# Patient Record
Sex: FEMALE | Race: WHITE | HISPANIC OR LATINO | Employment: UNEMPLOYED | URBAN - METROPOLITAN AREA
[De-identification: names, ages, dates, MRNs, and addresses within clinical notes are randomized per-mention and may not be internally consistent; named-entity substitution may affect disease eponyms.]

---

## 2018-01-18 ENCOUNTER — APPOINTMENT (OUTPATIENT)
Dept: RADIOLOGY | Facility: MEDICAL CENTER | Age: 23
End: 2018-01-18
Attending: EMERGENCY MEDICINE
Payer: COMMERCIAL

## 2018-01-18 ENCOUNTER — HOSPITAL ENCOUNTER (EMERGENCY)
Facility: MEDICAL CENTER | Age: 23
End: 2018-01-18
Attending: EMERGENCY MEDICINE
Payer: COMMERCIAL

## 2018-01-18 VITALS
DIASTOLIC BLOOD PRESSURE: 69 MMHG | HEIGHT: 63 IN | BODY MASS INDEX: 16.37 KG/M2 | RESPIRATION RATE: 18 BRPM | TEMPERATURE: 97.7 F | SYSTOLIC BLOOD PRESSURE: 112 MMHG | HEART RATE: 82 BPM | WEIGHT: 92.37 LBS | OXYGEN SATURATION: 100 %

## 2018-01-18 DIAGNOSIS — O21.0 HYPEREMESIS GRAVIDARUM: ICD-10-CM

## 2018-01-18 LAB
ANION GAP SERPL CALC-SCNC: 17 MMOL/L (ref 0–11.9)
APPEARANCE UR: CLEAR
B-HCG SERPL-ACNC: ABNORMAL MIU/ML (ref 0–5)
BACTERIA #/AREA URNS HPF: ABNORMAL /HPF
BACTERIA GENITAL QL WET PREP: NORMAL
BASOPHILS # BLD AUTO: 0.7 % (ref 0–1.8)
BASOPHILS # BLD: 0.06 K/UL (ref 0–0.12)
BILIRUB UR QL STRIP.AUTO: NEGATIVE
BUN SERPL-MCNC: 19 MG/DL (ref 8–22)
C TRACH DNA SPEC QL NAA+PROBE: NEGATIVE
CALCIUM SERPL-MCNC: 10.4 MG/DL (ref 8.5–10.5)
CHLORIDE SERPL-SCNC: 103 MMOL/L (ref 96–112)
CO2 SERPL-SCNC: 15 MMOL/L (ref 20–33)
COLOR UR: YELLOW
CREAT SERPL-MCNC: 0.55 MG/DL (ref 0.5–1.4)
CULTURE IF INDICATED INDCX: YES UA CULTURE
EOSINOPHIL # BLD AUTO: 0 K/UL (ref 0–0.51)
EOSINOPHIL NFR BLD: 0 % (ref 0–6.9)
EPI CELLS #/AREA URNS HPF: ABNORMAL /HPF
ERYTHROCYTE [DISTWIDTH] IN BLOOD BY AUTOMATED COUNT: 39.1 FL (ref 35.9–50)
GLUCOSE SERPL-MCNC: 72 MG/DL (ref 65–99)
GLUCOSE UR STRIP.AUTO-MCNC: NEGATIVE MG/DL
HCT VFR BLD AUTO: 48 % (ref 37–47)
HGB BLD-MCNC: 16.5 G/DL (ref 12–16)
HYALINE CASTS #/AREA URNS LPF: ABNORMAL /LPF
IMM GRANULOCYTES # BLD AUTO: 0.03 K/UL (ref 0–0.11)
IMM GRANULOCYTES NFR BLD AUTO: 0.4 % (ref 0–0.9)
KETONES UR STRIP.AUTO-MCNC: >=160 MG/DL
LEUKOCYTE ESTERASE UR QL STRIP.AUTO: ABNORMAL
LIPASE SERPL-CCNC: 27 U/L (ref 11–82)
LYMPHOCYTES # BLD AUTO: 1.08 K/UL (ref 1–4.8)
LYMPHOCYTES NFR BLD: 13 % (ref 22–41)
MCH RBC QN AUTO: 29.7 PG (ref 27–33)
MCHC RBC AUTO-ENTMCNC: 34.4 G/DL (ref 33.6–35)
MCV RBC AUTO: 86.3 FL (ref 81.4–97.8)
MICRO URNS: ABNORMAL
MONOCYTES # BLD AUTO: 0.33 K/UL (ref 0–0.85)
MONOCYTES NFR BLD AUTO: 4 % (ref 0–13.4)
N GONORRHOEA DNA SPEC QL NAA+PROBE: NEGATIVE
NEUTROPHILS # BLD AUTO: 6.8 K/UL (ref 2–7.15)
NEUTROPHILS NFR BLD: 81.9 % (ref 44–72)
NITRITE UR QL STRIP.AUTO: NEGATIVE
NRBC # BLD AUTO: 0 K/UL
NRBC BLD-RTO: 0 /100 WBC
NUMBER OF RH DOSES IND 8505RD: NORMAL
PH UR STRIP.AUTO: 5.5 [PH]
PLATELET # BLD AUTO: 329 K/UL (ref 164–446)
PMV BLD AUTO: 9.3 FL (ref 9–12.9)
POTASSIUM SERPL-SCNC: 4 MMOL/L (ref 3.6–5.5)
PROT UR QL STRIP: NEGATIVE MG/DL
RBC # BLD AUTO: 5.56 M/UL (ref 4.2–5.4)
RBC # URNS HPF: ABNORMAL /HPF
RBC UR QL AUTO: NEGATIVE
RH BLD: NORMAL
SIGNIFICANT IND 70042: NORMAL
SITE SITE: NORMAL
SODIUM SERPL-SCNC: 135 MMOL/L (ref 135–145)
SOURCE SOURCE: NORMAL
SP GR UR STRIP.AUTO: 1.03
SPECIMEN SOURCE: NORMAL
UROBILINOGEN UR STRIP.AUTO-MCNC: 0.2 MG/DL
WBC # BLD AUTO: 8.3 K/UL (ref 4.8–10.8)
WBC #/AREA URNS HPF: ABNORMAL /HPF

## 2018-01-18 PROCEDURE — 81001 URINALYSIS AUTO W/SCOPE: CPT

## 2018-01-18 PROCEDURE — 87491 CHLMYD TRACH DNA AMP PROBE: CPT

## 2018-01-18 PROCEDURE — 84702 CHORIONIC GONADOTROPIN TEST: CPT

## 2018-01-18 PROCEDURE — 87591 N.GONORRHOEAE DNA AMP PROB: CPT

## 2018-01-18 PROCEDURE — 86901 BLOOD TYPING SEROLOGIC RH(D): CPT

## 2018-01-18 PROCEDURE — 76817 TRANSVAGINAL US OBSTETRIC: CPT

## 2018-01-18 PROCEDURE — 96361 HYDRATE IV INFUSION ADD-ON: CPT

## 2018-01-18 PROCEDURE — 83690 ASSAY OF LIPASE: CPT

## 2018-01-18 PROCEDURE — 85025 COMPLETE CBC W/AUTO DIFF WBC: CPT

## 2018-01-18 PROCEDURE — 87086 URINE CULTURE/COLONY COUNT: CPT

## 2018-01-18 PROCEDURE — 700111 HCHG RX REV CODE 636 W/ 250 OVERRIDE (IP): Performed by: EMERGENCY MEDICINE

## 2018-01-18 PROCEDURE — 96374 THER/PROPH/DIAG INJ IV PUSH: CPT

## 2018-01-18 PROCEDURE — 80048 BASIC METABOLIC PNL TOTAL CA: CPT

## 2018-01-18 PROCEDURE — 99285 EMERGENCY DEPT VISIT HI MDM: CPT

## 2018-01-18 PROCEDURE — 700105 HCHG RX REV CODE 258: Performed by: EMERGENCY MEDICINE

## 2018-01-18 RX ORDER — ONDANSETRON 2 MG/ML
4 INJECTION INTRAMUSCULAR; INTRAVENOUS ONCE
Status: COMPLETED | OUTPATIENT
Start: 2018-01-18 | End: 2018-01-18

## 2018-01-18 RX ORDER — SODIUM CHLORIDE 9 MG/ML
1000 INJECTION, SOLUTION INTRAVENOUS ONCE
Status: COMPLETED | OUTPATIENT
Start: 2018-01-18 | End: 2018-01-18

## 2018-01-18 RX ORDER — ONDANSETRON 4 MG/1
4 TABLET, ORALLY DISINTEGRATING ORAL EVERY 8 HOURS PRN
Qty: 10 TAB | Refills: 0 | Status: SHIPPED | OUTPATIENT
Start: 2018-01-18 | End: 2020-11-06

## 2018-01-18 RX ADMIN — ONDANSETRON 4 MG: 2 INJECTION INTRAMUSCULAR; INTRAVENOUS at 12:45

## 2018-01-18 RX ADMIN — SODIUM CHLORIDE 1000 ML: 9 INJECTION, SOLUTION INTRAVENOUS at 12:45

## 2018-01-18 ASSESSMENT — PAIN SCALES - GENERAL: PAINLEVEL_OUTOF10: 0

## 2018-01-18 NOTE — ED NOTES
Pt to triage c/o N/V x 1 week. Pt unable to tolerate fluids.  Pt 6 weeks pregnant. Denies vaginal bleeding. Pt advised to return to triage nurse for any changes or concerns.

## 2018-01-18 NOTE — DISCHARGE INSTRUCTIONS
Morning Sickness  Morning sickness is when you feel sick to your stomach (nauseous) during pregnancy. This nauseous feeling may or may not come with vomiting. It often occurs in the morning but can be a problem any time of day. Morning sickness is most common during the first trimester, but it may continue throughout pregnancy. While morning sickness is unpleasant, it is usually harmless unless you develop severe and continual vomiting (hyperemesis gravidarum). This condition requires more intense treatment.   CAUSES   The cause of morning sickness is not completely known but seems to be related to normal hormonal changes that occur in pregnancy.  RISK FACTORS  You are at greater risk if you:  · Experienced nausea or vomiting before your pregnancy.  · Had morning sickness during a previous pregnancy.  · Are pregnant with more than one baby, such as twins.  TREATMENT   Do not use any medicines (prescription, over-the-counter, or herbal) for morning sickness without first talking to your health care provider. Your health care provider may prescribe or recommend:  · Vitamin B6 supplements.  · Anti-nausea medicines.  · The herbal medicine tung.  HOME CARE INSTRUCTIONS   · Only take over-the-counter or prescription medicines as directed by your health care provider.  · Taking multivitamins before getting pregnant can prevent or decrease the severity of morning sickness in most women.  · Eat a piece of dry toast or unsalted crackers before getting out of bed in the morning.  · Eat five or six small meals a day.  · Eat dry and bland foods (rice, baked potato). Foods high in carbohydrates are often helpful.  · Do not drink liquids with your meals. Drink liquids between meals.  · Avoid greasy, fatty, and spicy foods.  · Get someone to cook for you if the smell of any food causes nausea and vomiting.  · If you feel nauseous after taking prenatal vitamins, take the vitamins at night or with a snack.   · Snack on protein  foods (nuts, yogurt, cheese) between meals if you are hungry.  · Eat unsweetened gelatins for desserts.  · Wearing an acupressure wristband (worn for sea sickness) may be helpful.  · Acupuncture may be helpful.  · Do not smoke.  · Get a humidifier to keep the air in your house free of odors.  · Get plenty of fresh air.  SEEK MEDICAL CARE IF:   · Your home remedies are not working, and you need medicine.  · You feel dizzy or lightheaded.  · You are losing weight.  SEEK IMMEDIATE MEDICAL CARE IF:   · You have persistent and uncontrolled nausea and vomiting.  · You pass out (faint).  MAKE SURE YOU:  · Understand these instructions.  · Will watch your condition.  · Will get help right away if you are not doing well or get worse.     This information is not intended to replace advice given to you by your health care provider. Make sure you discuss any questions you have with your health care provider.     Document Released: 02/08/2008 Document Revised: 12/23/2014 Document Reviewed: 06/04/2014  Elsevier Interactive Patient Education ©2016 [employer]

## 2018-01-18 NOTE — ED PROVIDER NOTES
"ED Provider Note    Scribed for Tye Degroot D.O. by Lorelei Ogden. 2018  12:19 PM    Primary care provider: Pcp Pt States None  Means of arrival: Private vehicle  History obtained from: Patient  History limited by: None    CHIEF COMPLAINT  Chief Complaint   Patient presents with   • Morning Sickness     HPI  Chelsey Florentino is a 23 y.o. female who presents to the Emergency Department for evaluation of morning sickness. Patient reports she is currently six-weeks pregnant, A0. LMP was six weeks ago. She states her morning sickness began one week ago and has been severe. She states she has not been able to eat or drink anything without vomiting and this is abnormal from previous pregnancy. Patient states associated diffuse lower abdominal pain and mild vaginal discharge. Denies vaginal bleeding or dysuria. Denies history of abdominal surgeries. Denies medical history.     REVIEW OF SYSTEMS  Pertinent positives include morning sickness, slight diffuse abdominal pain . Pertinent negatives include no fever, hematemesis, hematochezia, melena, dysuria, hematuria, chest pain    PAST MEDICAL HISTORY  No past medical history on file.    SURGICAL HISTORY  No past surgical history on file.     SOCIAL HISTORY  Social History   Substance Use Topics   • Smoking status: Former Smoker     Quit date: 2015   • Smokeless tobacco: Never Used      Comment: last used 3 years ago   • Alcohol use No      Comment: occ      History   Drug Use No       FAMILY HISTORY  Family History   Problem Relation Age of Onset   • Arthritis Maternal Grandmother    • Arthritis Maternal Grandfather        CURRENT MEDICATIONS  Home Medications    **Home medications have not yet been reviewed for this encounter**         ALLERGIES  No Known Allergies    PHYSICAL EXAM  VITAL SIGNS: /66   Pulse (!) 106   Temp 36.2 °C (97.2 °F)   Resp 18   Ht 1.6 m (5' 3\")   Wt 41.9 kg (92 lb 6 oz)   SpO2 100%   BMI 16.36 kg/m² "   Constitutional: Well developed, Well nourished, mild distress, with full emesis bag, Non-toxic appearance.   HENT: Normocephalic, Atraumatic, tympanic membranes normal, moist mucous membranes, No oral exudates, no rhinorrhea.  Eyes: PERRLA, EOMI, Conjunctiva normal, No discharge.   Cardiovascular: Slightly tachycardic Normal rhythm, No murmurs, No rubs, No gallops.   Thorax & Lungs:  No respiratory distress, No rales, No rhonchi, No wheezing, No chest tenderness.   Abdomen: Bowel sounds normal, Soft, slight suprapubic tenderness, No guarding, No rebound, No masses, No pulsatile masses.   Skin: Warm, Dry, No erythema, No rash.   Back: No thoracic or lumbar spinetenderness, No CVA tenderness.   Extremities: No edema, No evidence of deformity, Full range of motion,  Neurologic: Alert & oriented x 3,  No focal deficits noted, normal gait.  holign emeis bag   Slight suprapubic abdominal tendnerness, negatie murphys sign, no [place] tnederness,     DIAGNOSTIC STUDIES/PROCEDURES    LABS  Results for orders placed or performed during the hospital encounter of 01/18/18   CBC WITH DIFFERENTIAL   Result Value Ref Range    WBC 8.3 4.8 - 10.8 K/uL    RBC 5.56 (H) 4.20 - 5.40 M/uL    Hemoglobin 16.5 (H) 12.0 - 16.0 g/dL    Hematocrit 48.0 (H) 37.0 - 47.0 %    MCV 86.3 81.4 - 97.8 fL    MCH 29.7 27.0 - 33.0 pg    MCHC 34.4 33.6 - 35.0 g/dL    RDW 39.1 35.9 - 50.0 fL    Platelet Count 329 164 - 446 K/uL    MPV 9.3 9.0 - 12.9 fL    Neutrophils-Polys 81.90 (H) 44.00 - 72.00 %    Lymphocytes 13.00 (L) 22.00 - 41.00 %    Monocytes 4.00 0.00 - 13.40 %    Eosinophils 0.00 0.00 - 6.90 %    Basophils 0.70 0.00 - 1.80 %    Immature Granulocytes 0.40 0.00 - 0.90 %    Nucleated RBC 0.00 /100 WBC    Neutrophils (Absolute) 6.80 2.00 - 7.15 K/uL    Lymphs (Absolute) 1.08 1.00 - 4.80 K/uL    Monos (Absolute) 0.33 0.00 - 0.85 K/uL    Eos (Absolute) 0.00 0.00 - 0.51 K/uL    Baso (Absolute) 0.06 0.00 - 0.12 K/uL    Immature Granulocytes  (abs) 0.03 0.00 - 0.11 K/uL    NRBC (Absolute) 0.00 K/uL   BASIC METABOLIC PANEL   Result Value Ref Range    Sodium 135 135 - 145 mmol/L    Potassium 4.0 3.6 - 5.5 mmol/L    Chloride 103 96 - 112 mmol/L    Co2 15 (L) 20 - 33 mmol/L    Glucose 72 65 - 99 mg/dL    Bun 19 8 - 22 mg/dL    Creatinine 0.55 0.50 - 1.40 mg/dL    Calcium 10.4 8.5 - 10.5 mg/dL    Anion Gap 17.0 (H) 0.0 - 11.9   LIPASE   Result Value Ref Range    Lipase 27 11 - 82 U/L   RH TYPE FOR RHOGAM FROM E.D.   Result Value Ref Range    Emergency Department Rh Typing POS     Number Of Rh Doses Indicated ZERO    WET PREP   Result Value Ref Range    Significant Indicator NEG     Source GEN     Site VAGINAL     Wet Prep For Parasites       Moderate WBC's seen.  Few clue cells seen.  No yeast.  No motile Trichomonas seen.     CHLAMYDIA & GC BY PCR   Result Value Ref Range    Source Vaginal    URINALYSIS CULTURE, IF INDICATED   Result Value Ref Range    Color Yellow     Character Clear     Specific Gravity 1.030 <1.035    Ph 5.5 5.0 - 8.0    Glucose Negative Negative mg/dL    Ketones >=160 Negative mg/dL    Protein Negative Negative mg/dL    Bilirubin Negative Negative    Urobilinogen, Urine 0.2 Negative    Nitrite Negative Negative    Leukocyte Esterase Trace (A) Negative    Occult Blood Negative Negative    Micro Urine Req Microscopic     Culture Indicated Yes UA Culture   ESTIMATED GFR   Result Value Ref Range    GFR If African American >60 >60 mL/min/1.73 m 2    GFR If Non African American >60 >60 mL/min/1.73 m 2   URINE MICROSCOPIC (W/UA)   Result Value Ref Range    WBC 2-5 /hpf    RBC 0-2 /hpf    Bacteria Moderate (A) None /hpf    Epithelial Cells Few /hpf    Hyaline Cast 0-2 /lpf         RADIOLOGY  US-OB PELVIS TRANSVAGINAL   Final Result      6 week 4 day IUP. OUMAR 9/9/2018. Heart rate 163 BPM.   Small subchorionic hemorrhage.   Small free fluid collection posterior cul-de-sac.   Multiple right ovarian cysts.        The radiologist's interpretation of  all radiological studies have been reviewed by me.    COURSE & MEDICAL DECISION MAKING  Nursing notes, VS, PMSFHx reviewed in chart.    12:19 PM - Patient seen and examined at bedside. Patient will be treated with Zofran.    This is a charming 23 y.o. female that presents with hyperemesis gravidarum. The patient had an ultrasound that revealed evidence of a 6 week intrauterine pregnancy and a right ovarian cyst. The patient has no evidence of significant infection, notes of ectopic pregnancy, she is a nontender abdomen that would be suspicious for appendicitis, cholecystitis or other significant abdominal conditions requiring surgical intervention. The patient received IV fluids, Zofran 4 mg IV. I reevaluation, she is resting currently, she is a nontender abdomen, nonsurgical abdomen. The patient will be following up with her gynecologist/OB and received a prescription as below. Strict return precautions have been given for vaginal bleeding, severe pain, fever or increasing abdominal discomfort.          1 L normal saline IV fluid was given secondary dehydration, vomiting tachycardia. Reevaluation, the patient has a normal blood pressure, she states she feels much better and rehydrated.  DISPOSITION:  Patient will be discharged home in stable condition.    FOLLOW UP:  [facility], Emergency Dept  [address] [facility] [id number] [phone]    If symptoms worsen    Gena Brooks M.D.  [address] [place] [id number] [phone]    Schedule an appointment as soon as possible for a visit  As needed      OUTPATIENT MEDICATIONS:  New Prescriptions    ONDANSETRON (ZOFRAN ODT) 4 MG TABLET DISPERSIBLE    Take 1 Tab by mouth every 8 hours as needed.       FINAL IMPRESSION  1. Hyperemesis gravidarum          Lorelei LEYVA (Dioni), am scribing for, and in the presence of, Tye Degroot D.O.    Electronically signed by: Lorelei Ogden (Dioni), 1/18/2018    AUTUMN  Tye Degroot D.O. personally performed the services described in this documentation, as scribed by Lorelei Ogden in my presence, and it is both accurate and complete.    The note accurately reflects work and decisions made by me.  Tye Degroot  1/18/2018  3:05 PM

## 2018-01-20 LAB
BACTERIA UR CULT: ABNORMAL
BACTERIA UR CULT: ABNORMAL
SIGNIFICANT IND 70042: ABNORMAL
SITE SITE: ABNORMAL
SOURCE SOURCE: ABNORMAL

## 2018-01-22 NOTE — PROGRESS NOTES
"ED Positive Culture Follow-up/Notification Note:    Date: 1/22/18     Patient seen in the ED on 1/18/2018 for:    1. Hyperemesis gravidarum       Discharge Medication List as of 1/18/2018  3:14 PM      START taking these medications    Details   ondansetron (ZOFRAN ODT) 4 MG TABLET DISPERSIBLE Take 1 Tab by mouth every 8 hours as needed., Disp-10 Tab, R-0, Print Rx Paper             Allergies: Patient has no known allergies.     Vitals:    01/18/18 1143 01/18/18 1205 01/18/18 1513   BP: 114/66  112/69   Pulse: (!) 106  82   Resp: 18  18   Temp: 36.2 °C (97.2 °F)  36.5 °C (97.7 °F)   SpO2: 100%     Weight:  41.9 kg (92 lb 6 oz)    Height: 1.6 m (5' 3\")         Final cultures:   Results     Procedure Component Value Units Date/Time    URINE CULTURE(NEW) [[id number]]  (Abnormal) Collected:  01/18/18 1240    Order Status:  Completed Specimen:  Urine Updated:  01/20/18 1750     Significant Indicator POS (POS)     Source UR     Site --     Urine Culture Mixed skin lissy <10,000 cfu/mL (A)     Urine Culture -- (A)     Probable Gardnerella vaginalis  >100,000 cfu/mL      CHLAMYDIA & GC BY PCR [[id number]] Collected:  01/18/18 1445    Order Status:  Completed Specimen:  Genital from Genital Updated:  01/18/18 2138     Source Vaginal     C. trachomatis by PCR Negative     N. gonorrhoeae by PCR Negative    URINE CULTURE (ADD ON) [[id number]]     Order Status:  Canceled Specimen:  Urine     WET PREP [[id number]] Collected:  01/18/18 1445    Order Status:  Completed Specimen:  Genital from Vaginal Updated:  01/18/18 1455     Significant Indicator NEG     Source GEN     Site VAGINAL     Wet Prep For Parasites --     Moderate WBC's seen.  Few clue cells seen.  No yeast.  No motile Trichomonas seen.      URINALYSIS CULTURE, IF INDICATED [[id number]]  (Abnormal) Collected:  01/18/18 1240    Order Status:  Completed Specimen:  Genital Updated:  01/18/18 1324     Color Yellow     Character Clear     Specific Gravity 1.030     Ph 5.5     " Glucose Negative mg/dL      Ketones >=160 mg/dL      Protein Negative mg/dL      Bilirubin Negative     Urobilinogen, Urine 0.2     Nitrite Negative     Leukocyte Esterase Trace (A)     Occult Blood Negative     Micro Urine Req Microscopic     Culture Indicated Yes UA Culture           Plan:   - Gardnerella vaginalis is a component of the normal skin lissy that was isolated on culture.  Since no symptoms of bacterial vaginosis were documented, no treatment is indicated at this time.     Dory Fernandez

## 2020-10-28 ENCOUNTER — TELEPHONE (OUTPATIENT)
Dept: SCHEDULING | Facility: IMAGING CENTER | Age: 25
End: 2020-10-28

## 2020-11-06 ENCOUNTER — OFFICE VISIT (OUTPATIENT)
Dept: MEDICAL GROUP | Age: 25
End: 2020-11-06
Payer: COMMERCIAL

## 2020-11-06 VITALS
HEART RATE: 80 BPM | TEMPERATURE: 97.9 F | SYSTOLIC BLOOD PRESSURE: 112 MMHG | BODY MASS INDEX: 22.08 KG/M2 | DIASTOLIC BLOOD PRESSURE: 70 MMHG | OXYGEN SATURATION: 97 % | WEIGHT: 120 LBS | HEIGHT: 62 IN

## 2020-11-06 DIAGNOSIS — Z76.89 ESTABLISHING CARE WITH NEW DOCTOR, ENCOUNTER FOR: ICD-10-CM

## 2020-11-06 DIAGNOSIS — M79.5 FOREIGN BODY (FB) IN SOFT TISSUE: ICD-10-CM

## 2020-11-06 DIAGNOSIS — Z23 NEED FOR VACCINATION: ICD-10-CM

## 2020-11-06 DIAGNOSIS — Z78.9 BODY PIERCING: ICD-10-CM

## 2020-11-06 PROCEDURE — 90471 IMMUNIZATION ADMIN: CPT | Performed by: PHYSICIAN ASSISTANT

## 2020-11-06 PROCEDURE — 90686 IIV4 VACC NO PRSV 0.5 ML IM: CPT | Performed by: PHYSICIAN ASSISTANT

## 2020-11-06 PROCEDURE — 99203 OFFICE O/P NEW LOW 30 MIN: CPT | Mod: 25 | Performed by: PHYSICIAN ASSISTANT

## 2020-11-06 ASSESSMENT — PATIENT HEALTH QUESTIONNAIRE - PHQ9
CLINICAL INTERPRETATION OF PHQ2 SCORE: 1
SUM OF ALL RESPONSES TO PHQ QUESTIONS 1-9: 5
5. POOR APPETITE OR OVEREATING: 0 - NOT AT ALL

## 2020-11-06 NOTE — PROGRESS NOTES
"cc: Establish care and removal of body piercing    Subjective:     HPI  Chelsey Florentino is a 25 y.o. female presenting to [facility] care.  She has never been seen by primary care.  She is currently followed by gynecology.  She is scheduled for Pap in 2 months.  She has no significant past medical history.  She exercises regularly.  She is a stay-at-home mom.  Denies dizziness, chest pain, palpitations, shortness of breath.    Body piercing  She has two piercings in her lower back.  One of the piercings on the left side has become embedded.  She did go back to her body [facility] to see if they can remove it, they state that it needs to be excised and they cannot do it.  She is requesting removal.  Denies surrounding erythema, edema, warmth, drainage.    Review of systems:  See above.       Current Outpatient Medications:   •  PRENATAL VIT W/ FE BISG-FA PO, Take  by mouth., Disp: , Rfl:     Allergies, past medical history, past surgical history, family history, social history reviewed and updated    Objective:     Vitals: /70 (BP Location: Left arm, Patient Position: Sitting, BP Cuff Size: Adult)   Pulse 80   Temp 36.6 °C (97.9 °F) (Temporal)   Ht 1.575 m (5' 2\")   Wt 54.4 kg (120 lb)   SpO2 97%   BMI 21.95 kg/m²   General: Alert, pleasant, NAD  HEENT: Normocephalic. Neck supple.  No thyromegaly or masses palpated. No cervical or supraclavicular lymphadenopathy. No carotid bruits   Heart: Regular rate and rhythm.  S1 and S2 normal.  No murmurs appreciated.  Respiratory: Normal respiratory effort.  Clear to auscultation bilaterally.  Skin: Warm, dry, no rashes.  Back: 2 piercings of the lower back, left piercing is embedded.  No surrounding erythema, edema, warmth, drainage  Extremities: No leg edema.  Radial pulses 2+ symmetric  Psych:  Affect/mood is normal, judgement is good, memory is intact, grooming is appropriate.    Assessment/Plan:     Chelsey was seen today for establish care and " other.    Diagnoses and all orders for this visit:    Foreign body (FB) in soft tissue  -She is requesting removal of her body piercings.  Evaluated with Dr. Velasco and he will remove the piercings.    Body piercing  -See above    Need for vaccination  -Immunization given in clinic today  -     Influenza Vaccine Quad Injection (PF)    Establishing care with new doctor, encounter for  -We will request old records and review when received.  She is to follow-up with gynecology for Pap.    Return for With Dr. Velasco for excision of piercings.   3 months for annual physical

## 2020-11-22 ENCOUNTER — HOSPITAL ENCOUNTER (OUTPATIENT)
Facility: MEDICAL CENTER | Age: 25
End: 2020-11-22
Attending: NURSE PRACTITIONER
Payer: COMMERCIAL

## 2020-11-22 ENCOUNTER — OFFICE VISIT (OUTPATIENT)
Dept: URGENT CARE | Facility: CLINIC | Age: 25
End: 2020-11-22
Payer: COMMERCIAL

## 2020-11-22 VITALS
DIASTOLIC BLOOD PRESSURE: 74 MMHG | HEIGHT: 62 IN | OXYGEN SATURATION: 99 % | RESPIRATION RATE: 18 BRPM | TEMPERATURE: 98.1 F | WEIGHT: 120 LBS | SYSTOLIC BLOOD PRESSURE: 112 MMHG | HEART RATE: 86 BPM | BODY MASS INDEX: 22.08 KG/M2

## 2020-11-22 DIAGNOSIS — Z20.822 EXPOSURE TO COVID-19 VIRUS: ICD-10-CM

## 2020-11-22 DIAGNOSIS — Z20.822 SUSPECTED COVID-19 VIRUS INFECTION: ICD-10-CM

## 2020-11-22 PROCEDURE — 99214 OFFICE O/P EST MOD 30 MIN: CPT | Mod: CS | Performed by: NURSE PRACTITIONER

## 2020-11-22 PROCEDURE — U0003 INFECTIOUS AGENT DETECTION BY NUCLEIC ACID (DNA OR RNA); SEVERE ACUTE RESPIRATORY SYNDROME CORONAVIRUS 2 (SARS-COV-2) (CORONAVIRUS DISEASE [COVID-19]), AMPLIFIED PROBE TECHNIQUE, MAKING USE OF HIGH THROUGHPUT TECHNOLOGIES AS DESCRIBED BY CMS-2020-01-R: HCPCS

## 2020-11-22 ASSESSMENT — ENCOUNTER SYMPTOMS
FEVER: 0
CHILLS: 0
WHEEZING: 0
SHORTNESS OF BREATH: 0
COUGH: 0
HEADACHES: 1
SORE THROAT: 1
NAUSEA: 0
VOMITING: 0
DIARRHEA: 1

## 2020-11-22 NOTE — PROGRESS NOTES
"Subjective:   Chelsey Florentino is a 25 y.o. female who presents for Nasal Congestion (Chest pain, headache, fatigue diarrhea x 3 days )      HPI  25 year old female in urgent care for new onset symptoms 3 days ago   COVID exposure: Close exposure to COVID  OTC medications: is not taking anything OTC for symptoms    Review of Systems   Constitutional: Positive for malaise/fatigue. Negative for chills and fever.   HENT: Positive for congestion and sore throat.    Respiratory: Negative for cough, shortness of breath and wheezing.    Gastrointestinal: Positive for diarrhea. Negative for nausea and vomiting.   Neurological: Positive for headaches.       Patient Active Problem List   Diagnosis   (none) - all problems resolved or deleted     History reviewed. No pertinent surgical history.  Social History     Tobacco Use   • Smoking status: Never Smoker   • Smokeless tobacco: Never Used   Substance Use Topics   • Alcohol use: Yes     Comment: occ   • Drug use: No      Family History   Problem Relation Age of Onset   • No Known Problems Mother    • No Known Problems Father    • No Known Problems Sister    • Arthritis Maternal Grandmother    • Arthritis Maternal Grandfather    • No Known Problems Son    • No Known Problems Daughter       (Allergies, Medications, & Tobacco/Substance Use were reconciled by the Medical Assistant and reviewed by myself. The family history is prepopulated)     Objective:     /74   Pulse 86   Temp 36.7 °C (98.1 °F) (Temporal)   Resp 18   Ht 1.575 m (5' 2\")   Wt 54.4 kg (120 lb)   SpO2 99%   BMI 21.95 kg/m²     Physical Exam  Vitals signs reviewed.   HENT:      Right Ear: Tympanic membrane, ear canal and external ear normal.      Left Ear: Tympanic membrane, ear canal and external ear normal.      Nose: Nose normal.      Mouth/Throat:      Lips: Pink.      Mouth: Mucous membranes are moist.      Pharynx: Uvula midline.   Cardiovascular:      Rate and Rhythm: Normal rate and regular " rhythm.      Heart sounds: Normal heart sounds.   Pulmonary:      Effort: Pulmonary effort is normal.      Breath sounds: Normal breath sounds.   Neurological:      Mental Status: She is alert and oriented to person, place, and time.   Psychiatric:         Mood and Affect: Mood normal.         Behavior: Behavior normal.         Thought Content: Thought content normal.         Judgment: Judgment normal.         Assessment/Plan:     1. Suspected COVID-19 virus infection  COVID/SARS COV-2 PCR   2. Exposure to COVID-19 virus       Discussed Physical examination findings with patient are likely viral in etiology and could be due to COVID so will perform testing. Advised patient to remain in self isolation until cleared by a negative test or the health department, if they test positive. If exposed to COVID, advised to stay home for 14 days post exposure due to the possibility of developing symptoms day 2-14 post exposure. Will release results to [place]. Strict ER precautions provided for worsening symptoms including SOB, difficulty breathing or high fever unable to be controlled by OTC tylenol or NSAIDS. Viral illness are largely self limiting and patient should increase fluids using electrolyte enriched beverages as well as OTC medications such as tylenol and ibuprofen per 's instructions.      Appropriate PPE worn at all times by provider. Patient had face mask on for entirety of visit other than during oropharyngeal examination    Differential diagnosis, natural history, supportive care, and indications for immediate follow-up discussed.    Advised the patient to follow-up with the primary care physician for recheck, reevaluation, and consideration of further management.    Please note that this dictation was created using voice recognition software. I have made a reasonable attempt to correct obvious errors, but I expect that there are errors of grammar and possibly content that I did not discover before  finalizing the note.    This note was electronically signed EMILEE White

## 2020-11-23 LAB
COVID ORDER STATUS COVID19: NORMAL
SARS-COV-2 RNA RESP QL NAA+PROBE: NOTDETECTED
SPECIMEN SOURCE: NORMAL

## 2020-12-02 ENCOUNTER — HOSPITAL ENCOUNTER (OUTPATIENT)
Facility: MEDICAL CENTER | Age: 25
End: 2020-12-02
Attending: FAMILY MEDICINE
Payer: COMMERCIAL

## 2020-12-02 ENCOUNTER — OFFICE VISIT (OUTPATIENT)
Dept: MEDICAL GROUP | Age: 25
End: 2020-12-02
Payer: COMMERCIAL

## 2020-12-02 VITALS
SYSTOLIC BLOOD PRESSURE: 100 MMHG | DIASTOLIC BLOOD PRESSURE: 72 MMHG | WEIGHT: 120.6 LBS | HEIGHT: 62 IN | HEART RATE: 86 BPM | BODY MASS INDEX: 22.19 KG/M2 | OXYGEN SATURATION: 99 % | TEMPERATURE: 98.9 F

## 2020-12-02 DIAGNOSIS — D48.9 NEOPLASM OF UNCERTAIN BEHAVIOR: ICD-10-CM

## 2020-12-02 LAB — PATHOLOGY CONSULT NOTE: NORMAL

## 2020-12-02 PROCEDURE — 88304 TISSUE EXAM BY PATHOLOGIST: CPT

## 2020-12-02 PROCEDURE — 10120 INC&RMVL FB SUBQ TISS SMPL: CPT | Mod: 59 | Performed by: FAMILY MEDICINE

## 2020-12-02 NOTE — PROGRESS NOTES
This medical record contains text that has been entered with the assistance of computer voice recognition and dictation software.  Therefore, it may contain unintended errors in text, spelling, punctuation, or grammar      Chief Complaint   Patient presents with   • Tattoo Removal     dermal piercing         Chelsey Florentino is a 25 y.o. female here evaluation and management of: Dermal piercing removal      HPI:     1. Neoplasm of uncertain behavior  Patient has had 2 dermal piercings in the lower back region for over 4 years now.  She states that 1 top has slipped off and it makes it difficult for her to wear high pants or even lay on her back at times because is painful she would like to have these dermal piercings removed.    Current medicines (including changes today)  Current Outpatient Medications   Medication Sig Dispense Refill   • PRENATAL VIT W/ FE BISG-FA PO Take  by mouth.       No current facility-administered medications for this visit.      She  has a past medical history of Ovarian cyst. She also has no past medical history of Addisons disease (HCC), Adrenal disorder (HCC), Allergy, Anemia, Anxiety, Blood transfusion without reported diagnosis, Clotting disorder (HCC), Cushings syndrome (HCC), Diabetic neuropathy (HCC), GERD (gastroesophageal reflux disease), Hyperlipidemia, IBD (inflammatory bowel disease), Meningitis, Migraine, Muscle disorder, Osteoporosis, Parathyroid disorder (HCC), Pituitary disease (HCC), Sickle cell disease (HCC), or Substance abuse ([facility]).  She  has no past surgical history on file.  Social History     Tobacco Use   • Smoking status: Never Smoker   • Smokeless tobacco: Never Used   Substance Use Topics   • Alcohol use: Yes     Comment: occ   • Drug use: No     Social History     Social History Narrative   • Not on file     Family History   Problem Relation Age of Onset   • No Known Problems Mother    • No Known Problems Father    • No Known Problems Sister    • Arthritis  "Maternal Grandmother    • Arthritis Maternal Grandfather    • No Known Problems Son    • No Known Problems Daughter      Family Status   Relation Name Status   • Mo  Alive   • Fa  Alive   • Sis 2 Alive   • MGMo  (Not Specified)   • MGFa  (Not Specified)   • Son  Alive   • Marisabel  Alive         ROS    The pertinent  ROS findings can be seen in the HPI above.     All other systems reviewed and are negative     Objective:     /72 (BP Location: Left arm, Patient Position: Sitting)   Pulse 86   Temp 37.2 °C (98.9 °F) (Temporal)   Ht 1.575 m (5' 2\")   Wt 54.7 kg (120 lb 9.6 oz)   SpO2 99%  Body mass index is 22.06 kg/m².      Physical Exam:    Constitutional: Alert, no distress.  Skin:       Excision--- 6mm, raised tender mass at lower right back        Similar mass on lower left back exactly during the other mass    After informed written consent was obtained, using Betadine for cleansing and 1% Lidocaine w- epinephrine for anesthetic, with sterile technique a 8 mm punch tool was used to obtain and excise  the lesion through dermis (full thickness) . Intent was to remove entire lesion with margins . Hemostasis was obtained by pressure and wound was   Sutured  With 3.0 Ethilon x2  Antibiotic dressing is applied, and wound care instructions provided. Be alert for any signs of cutaneous infection. The specimen is labeled and sent to pathology for evaluation. The procedure was well tolerated without complications.      Final repair length (measurement of repaired defect): 1.2cm    Same procedure repeated on 2nd mass on left lower back    Eye: Equal, round and reactive, conjunctiva clear, lids normal.  ENMT: Lips without lesions, good dentition, oropharynx clear.        Assessment and Plan:   The following treatment plan was discussed      1. Neoplasm of uncertain behavior    Patient tolerated procedure well  There were no adverse events  Patient was given post procedure precautions     - Pathology Specimen; Future  - " Pathology Specimen; Future            Instructed to Follow up in clinic or ER for worsening symptoms, difficulty breathing, lack of expected recovery, or should new symptoms or problems arise.    Followup: No follow-ups on file.       Once again this medical record contains text that has been entered with the assistance of computer voice recognition and dictation software.  Therefore, it may contain unintended errors in text, spelling, punctuation, or grammar

## 2020-12-11 ENCOUNTER — NON-PROVIDER VISIT (OUTPATIENT)
Dept: MEDICAL GROUP | Age: 25
End: 2020-12-11
Payer: COMMERCIAL

## 2020-12-11 PROCEDURE — 99999 PR NO CHARGE: CPT | Performed by: FAMILY MEDICINE

## 2020-12-11 NOTE — PROGRESS NOTES
Chelsey Florentino is a 25 y.o. female here for a Non-Provider Visit for Suture Removal.    Sutures were placed by Dr Velasco on date: 12/2/2020  Skin is healed: Yes  Provider notified if skin is not healed, or if there is redness, heat, pain, or drainage from incision: yes  Sutures removed. 2 sutures from each site, 4 total  Mastisol and steristips are placed: No    Advised to use emollient (vaseline, aquaphor, etc.) as needed, avoid peroxide and antibiotic ointment to reduce irritation.     Path report has been reviewed by provider.  Path report has reviewed with patient.

## 2021-02-16 ENCOUNTER — OFFICE VISIT (OUTPATIENT)
Dept: MEDICAL GROUP | Age: 26
End: 2021-02-16
Payer: COMMERCIAL

## 2021-02-16 VITALS
HEART RATE: 89 BPM | SYSTOLIC BLOOD PRESSURE: 102 MMHG | OXYGEN SATURATION: 98 % | BODY MASS INDEX: 22.74 KG/M2 | HEIGHT: 62 IN | WEIGHT: 123.6 LBS | DIASTOLIC BLOOD PRESSURE: 62 MMHG | TEMPERATURE: 98.2 F

## 2021-02-16 DIAGNOSIS — R45.4 IRRITABILITY AND ANGER: ICD-10-CM

## 2021-02-16 DIAGNOSIS — Z78.9 VEGETARIAN: ICD-10-CM

## 2021-02-16 DIAGNOSIS — Z00.00 WELL ADULT EXAM: ICD-10-CM

## 2021-02-16 PROBLEM — D48.9 NEOPLASM OF UNCERTAIN BEHAVIOR: Status: RESOLVED | Noted: 2020-12-02 | Resolved: 2021-02-16

## 2021-02-16 PROCEDURE — 99395 PREV VISIT EST AGE 18-39: CPT | Mod: 33 | Performed by: PHYSICIAN ASSISTANT

## 2021-02-16 RX ORDER — SODIUM PHOSPHATE,MONO-DIBASIC 19G-7G/118
500 ENEMA (ML) RECTAL
COMMUNITY
End: 2023-11-30

## 2021-02-16 RX ORDER — MULTIVIT WITH MINERALS/LUTEIN
TABLET ORAL
COMMUNITY
End: 2023-11-30

## 2021-02-16 RX ORDER — CHLORAL HYDRATE 500 MG
1000 CAPSULE ORAL
COMMUNITY
End: 2023-11-30

## 2021-02-16 ASSESSMENT — PATIENT HEALTH QUESTIONNAIRE - PHQ9
SUM OF ALL RESPONSES TO PHQ QUESTIONS 1-9: 8
CLINICAL INTERPRETATION OF PHQ2 SCORE: 1
5. POOR APPETITE OR OVEREATING: 1 - SEVERAL DAYS

## 2021-02-16 NOTE — PROGRESS NOTES
Subjective:     CC:   Chief Complaint   Patient presents with   • Annual Exam       HPI:     Chelsey Florentino is a 26 y.o. female who presents for annual exam  The past few months she has felt herself becoming more angry and irritable.  She is going through a stressful time right now, they are trying to move down to [facility] and buying a new house, she is also completing school, and staying at home with her 2 children during the day.  She is quick to become angry, and this is not like herself.  She has been trying to find a counselor through her insurance, however this has not been successful.  She does not feel she needs medications at this time, but would like to get into counseling.    Patient has GYN provider: Yes  Last Pap Smear: 2021   H/O Abnormal Pap: No      Exercise: strenuous regular exercise, aerobic > 3 hours a week  Diet: FAIR-VEGATARIAN    No LMP recorded (lmp unknown).  Hx STDs: No  Birth control: IUD  No Menses Reports mild cramping and does take OTC analgesics for cramps.  No significant bloating/fluid retention, pelvic pain, or dyspareunia. No abnormal vaginal discharge.  No breast tenderness, mass, nipple discharge, changes in size or contour, or abnormal cyclic discomfort.    OB History    Para Term  AB Living   1 1 1 0 0 1   SAB TAB Ectopic Molar Multiple Live Births   0 0 0 0 0 1      She  reports being sexually active and has had partner(s) who are Male.    She  has a past medical history of Neoplasm of uncertain behavior (2020) and Ovarian cyst. She also has no past medical history of Addisons disease ([facility]), Adrenal disorder ([facility]), Allergy, Anemia, Anxiety, Blood transfusion without reported diagnosis, Clotting disorder ([facility]), Cushings syndrome ([facility]), Diabetic neuropathy ([facility]), GERD (gastroesophageal reflux disease), Hyperlipidemia, IBD (inflammatory bowel disease), Meningitis, Migraine, Muscle disorder, Osteoporosis, Parathyroid disorder ([facility]), Pituitary  disease ([facility]), Sickle cell disease ([facility]), or Substance abuse ([facility]).  She  has no past surgical history on file.    Family History   Problem Relation Age of Onset   • No Known Problems Mother    • No Known Problems Father    • No Known Problems Sister    • Arthritis Maternal Grandmother    • Arthritis Maternal Grandfather    • No Known Problems Son    • No Known Problems Daughter      Social History     Tobacco Use   • Smoking status: Never Smoker   • Smokeless tobacco: Never Used   Substance Use Topics   • Alcohol use: Yes     Alcohol/week: 4.2 oz     Types: 7 Cans of beer per week     Comment: occ   • Drug use: Not Currently     Types: Marijuana       There are no problems to display for this patient.    Current Outpatient Medications   Medication Sig Dispense Refill   • Ascorbic Acid (VITAMIN C) 1000 MG Tab Take  by mouth.     • glucosamine Sulfate 500 MG Cap Take 500 mg by mouth 3 times a day, with meals.     • Omega-3 Fatty Acids (FISH OIL) 1000 MG Cap capsule Take 1,000 mg by mouth 3 times a day, with meals.     • Emollient (COLLAGEN EX) Apply  topically.       No current facility-administered medications for this visit.     No Known Allergies    Review of Systems   Constitutional: Negative for fever, chills and malaise/fatigue.   HENT: Negative for congestion.    Eyes: Negative for pain.   Respiratory: Negative for cough and shortness of breath.    Cardiovascular: Negative for chest pain and leg swelling.   Gastrointestinal: Negative for nausea, vomiting, abdominal pain and diarrhea.   Genitourinary: Negative for dysuria and hematuria.   Skin: Negative for rash.   Neurological: Negative for dizziness, focal weakness and headaches.   Endo/Heme/Allergies: Does not bruise/bleed easily.   Psychiatric/Behavioral: Negative for depression.  The patient is not nervous/anxious.      Objective:   /62 (BP Location: Left arm, Patient Position: Sitting, BP Cuff Size: Adult)   Pulse 89   Temp 36.8 °C (98.2 °F)  "(Temporal)   Ht 1.575 m (5' 2\")   Wt 56.1 kg (123 lb 9.6 oz)   LMP  (LMP Unknown)   SpO2 98%   BMI 22.61 kg/m²     Wt Readings from Last 4 Encounters:   02/16/21 56.1 kg (123 lb 9.6 oz)   12/02/20 54.7 kg (120 lb 9.6 oz)   11/22/20 54.4 kg (120 lb)   11/06/20 54.4 kg (120 lb)         Physical Exam:  Constitutional: Well-developed and well-nourished. Not diaphoretic. No distress.   Skin: Skin is warm and dry. No rash noted.  Head: Atraumatic without lesions.  Eyes: Conjunctivae and extraocular motions are normal. Pupils are equal, round, and reactive to light. No scleral icterus.   Ears:  External ears unremarkable. Tympanic membranes clear and intact.  Nose: Nares patent. Septum midline. Turbinates without erythema nor edema. No discharge.   Mouth/Throat: Tongue normal. Oropharynx is clear and moist. Posterior pharynx without erythema or exudates.  Neck: Supple, trachea midline. Normal range of motion. No thyromegaly present. No lymphadenopathy--cervical or supraclavicular.  Cardiovascular: Regular rate and rhythm, S1 and S2 without murmur, rubs, or gallops.    Respiratory: Effort normal. Clear to auscultation throughout. No adventitious sounds.   Abdomen: Soft, non tender, and without distention. Active bowel sounds in all four quadrants. No rebound, guarding, masses or HSM.  Extremities: No cyanosis, clubbing, erythema, nor edema. Distal pulses intact and symmetric.   Musculoskeletal: All major joints AROM full in all directions without pain.  Neurological: Alert and oriented x 3. Grossly non-focal. Strength and sensation grossly intact. DTRs 2+/3 and symmetric.   Psychiatric:  Behavior, mood, and affect are appropriate.    Assessment and Plan:     1. Well adult exam  -Advised to continue with regular physical activity and good control of diet.  She is up-to-date on her Pap.  Follow-up with gynecology as recommended    2. Vegetarian  -She is currently taking oral iron.  Will check CBC to evaluate for iron " deficiency.  - CBC WITH DIFFERENTIAL; Future    3. Irritability and anger  -She has felt herself becoming easily agitated.  She does not feel she needs medications at this time.  I am agreeable.  Will place referral to counseling.  - REFERRAL TO BEHAVIORAL HEALTH      Health maintenance:     Labs per orders  Immunizations per orders  Patient counseled about skin care, diet, supplements, and exercise.  Discussed  breast self exam, adequate intake of calcium and vitamin D, diet and exercise     Follow-up: Return in about 1 year (around 2/16/2022) for Annual PX.

## 2022-02-03 ENCOUNTER — HOSPITAL ENCOUNTER (OUTPATIENT)
Facility: MEDICAL CENTER | Age: 27
End: 2022-02-03
Attending: STUDENT IN AN ORGANIZED HEALTH CARE EDUCATION/TRAINING PROGRAM
Payer: COMMERCIAL

## 2022-02-03 ENCOUNTER — OFFICE VISIT (OUTPATIENT)
Dept: URGENT CARE | Facility: CLINIC | Age: 27
End: 2022-02-03
Payer: COMMERCIAL

## 2022-02-03 VITALS
RESPIRATION RATE: 20 BRPM | HEART RATE: 97 BPM | TEMPERATURE: 98.2 F | OXYGEN SATURATION: 98 % | WEIGHT: 113 LBS | DIASTOLIC BLOOD PRESSURE: 60 MMHG | HEIGHT: 62 IN | SYSTOLIC BLOOD PRESSURE: 100 MMHG | BODY MASS INDEX: 20.8 KG/M2

## 2022-02-03 DIAGNOSIS — J02.9 VIRAL PHARYNGITIS: ICD-10-CM

## 2022-02-03 DIAGNOSIS — Z20.822 COVID-19 RULED OUT: ICD-10-CM

## 2022-02-03 PROCEDURE — U0003 INFECTIOUS AGENT DETECTION BY NUCLEIC ACID (DNA OR RNA); SEVERE ACUTE RESPIRATORY SYNDROME CORONAVIRUS 2 (SARS-COV-2) (CORONAVIRUS DISEASE [COVID-19]), AMPLIFIED PROBE TECHNIQUE, MAKING USE OF HIGH THROUGHPUT TECHNOLOGIES AS DESCRIBED BY CMS-2020-01-R: HCPCS

## 2022-02-03 PROCEDURE — 99203 OFFICE O/P NEW LOW 30 MIN: CPT | Mod: CS | Performed by: STUDENT IN AN ORGANIZED HEALTH CARE EDUCATION/TRAINING PROGRAM

## 2022-02-03 PROCEDURE — U0005 INFEC AGEN DETEC AMPLI PROBE: HCPCS

## 2022-02-03 RX ORDER — NITROFURANTOIN 25; 75 MG/1; MG/1
100 CAPSULE ORAL 2 TIMES DAILY
Qty: 10 CAPSULE | Refills: 0 | Status: SHIPPED | OUTPATIENT
Start: 2022-02-03 | End: 2022-02-03

## 2022-02-03 RX ORDER — FLUCONAZOLE 150 MG/1
TABLET ORAL
Qty: 2 TABLET | Refills: 0 | Status: SHIPPED | OUTPATIENT
Start: 2022-02-03 | End: 2022-02-03

## 2022-02-03 RX ORDER — DEXAMETHASONE SODIUM PHOSPHATE 10 MG/ML
10 INJECTION INTRAMUSCULAR; INTRAVENOUS ONCE
Status: COMPLETED | OUTPATIENT
Start: 2022-02-03 | End: 2022-02-03

## 2022-02-03 RX ADMIN — DEXAMETHASONE SODIUM PHOSPHATE 10 MG: 10 INJECTION INTRAMUSCULAR; INTRAVENOUS at 09:32

## 2022-02-03 NOTE — PROGRESS NOTES
"Subjective:   CHIEF COMPLAINT  Chief Complaint   Patient presents with   • Sore Throat     sore throat, LOV, swollen lymphs x 5 days       HPI  Chelsey Spear is a 27 y.o. female who presents with a chief complaint of hoarse voice, sore throat and cough x4 days.   She tried taking Tylenol Cold and flu which provided nominal relief of symptoms.  She denies associated symptoms of runny nose, nasal congestion, SOB or wheezing.  Patient is vaccined against COVID x3.  No sick contacts.      REVIEW OF SYSTEMS  General: no fever or chills  GI: no nausea or vomiting  See HPI for further details.    PAST MEDICAL HISTORY  There are no problems to display for this patient.      SURGICAL HISTORY  patient denies any surgical history    ALLERGIES  Not on File    CURRENT MEDICATIONS  Home Medications     Reviewed by Michael Graff'sabiha (Medical Assistant) on 02/03/22 at 0905  Med List Status: <None>   Medication Last Dose Status   fluconazole (DIFLUCAN) 150 MG tablet Not Taking Active   nitrofurantoin (MACROBID) 100 MG Cap Not Taking Active                SOCIAL HISTORY  Social History     Tobacco Use   • Smoking status: Not on file   • Smokeless tobacco: Not on file   Substance and Sexual Activity   • Alcohol use: Not on file   • Drug use: Not on file   • Sexual activity: Not on file       FAMILY HISTORY  No family history on file.       Objective:   PHYSICAL EXAM  VITAL SIGNS: /60 (BP Location: Left arm, Patient Position: Sitting, BP Cuff Size: Adult long)   Pulse 97   Temp 36.8 °C (98.2 °F) (Temporal)   Resp 20   Ht 1.575 m (5' 2\")   Wt 51.3 kg (113 lb)   SpO2 98%   BMI 20.67 kg/m²     Gen: no acute distress, normal voice  Skin: dry, intact, moist mucosal membranes  Lungs: CTAB w/ symmetric expansion  CV: RRR w/o murmurs or clicks  ENT: No pharyngeal erythema, ulcerations or exudates.  Uvula midline.  Bilateral anterior cervical lymphadenopathy.   Psych: normal affect, normal judgement, alert, " awake        Assessment/Plan:     1. Viral pharyngitis  dexamethasone (DECADRON) injection (check route below) 10 mg    COVID/SARS CoV-2 PCR   2. COVID-19 ruled out  COVID/SARS CoV-2 PCR   Signs symptoms consistent with a viral upper respiratory tract infection.  Patient is vaccinated against Covid x3.  -Ordered Decadron 10 mg p.o. x1  -Ordered Covid test.  Results of recent through MyChart  -Quarantine until results return.  Discussed [facility] quarantine guidelines  -Tylenol/ibuprofen as needed for symptomatic treatment  -Return to urgent care any new/worsening symptoms or further questions or concerns.  Patient understood everything discussed.  All questions were answered.        Differential diagnosis, natural history, supportive care, and indications for immediate follow-up discussed. All questions answered. Patient agrees with the plan of care.    Follow-up as needed if symptoms worsen or fail to improve to PCP, Urgent care or Emergency Room.    Please note that this dictation was created using voice recognition software. I have made a reasonable attempt to correct obvious errors, but I expect that there are errors of grammar and possibly content that I did not discover before finalizing the note.

## 2022-06-01 ENCOUNTER — OFFICE VISIT (OUTPATIENT)
Dept: URGENT CARE | Facility: CLINIC | Age: 27
End: 2022-06-01
Payer: COMMERCIAL

## 2022-06-01 ENCOUNTER — APPOINTMENT (OUTPATIENT)
Dept: URGENT CARE | Facility: CLINIC | Age: 27
End: 2022-06-01
Payer: COMMERCIAL

## 2022-06-01 VITALS
SYSTOLIC BLOOD PRESSURE: 112 MMHG | BODY MASS INDEX: 21.05 KG/M2 | TEMPERATURE: 98.4 F | HEIGHT: 62 IN | WEIGHT: 114.4 LBS | DIASTOLIC BLOOD PRESSURE: 74 MMHG | RESPIRATION RATE: 14 BRPM | OXYGEN SATURATION: 98 % | HEART RATE: 114 BPM

## 2022-06-01 DIAGNOSIS — R05.9 COUGH: ICD-10-CM

## 2022-06-01 DIAGNOSIS — J06.9 VIRAL URI: ICD-10-CM

## 2022-06-01 DIAGNOSIS — H92.09 OTALGIA, UNSPECIFIED LATERALITY: ICD-10-CM

## 2022-06-01 DIAGNOSIS — J98.01 BRONCHOSPASM: ICD-10-CM

## 2022-06-01 PROCEDURE — 99213 OFFICE O/P EST LOW 20 MIN: CPT | Performed by: PHYSICIAN ASSISTANT

## 2022-06-01 RX ORDER — ALBUTEROL SULFATE 90 UG/1
2 AEROSOL, METERED RESPIRATORY (INHALATION) EVERY 6 HOURS PRN
Qty: 8.5 G | Refills: 2 | Status: SHIPPED | OUTPATIENT
Start: 2022-06-01 | End: 2023-02-17

## 2022-06-01 RX ORDER — DEXTROMETHORPHAN HYDROBROMIDE AND PROMETHAZINE HYDROCHLORIDE 15; 6.25 MG/5ML; MG/5ML
5 SYRUP ORAL EVERY 4 HOURS PRN
Qty: 120 ML | Refills: 0 | Status: SHIPPED | OUTPATIENT
Start: 2022-06-01 | End: 2023-02-17

## 2022-06-01 RX ORDER — DESOGESTREL AND ETHINYL ESTRADIOL 0.15-0.03
1 KIT ORAL
COMMUNITY
Start: 2022-05-13 | End: 2023-02-17

## 2022-06-01 RX ORDER — BENZONATATE 100 MG/1
100 CAPSULE ORAL 3 TIMES DAILY PRN
Qty: 60 CAPSULE | Refills: 0 | Status: SHIPPED | OUTPATIENT
Start: 2022-06-01 | End: 2023-02-17

## 2022-06-01 RX ORDER — METHYLPREDNISOLONE 4 MG/1
4 TABLET ORAL DAILY
Qty: 21 TABLET | Refills: 0 | Status: SHIPPED | OUTPATIENT
Start: 2022-06-01 | End: 2023-02-17

## 2022-06-01 ASSESSMENT — ENCOUNTER SYMPTOMS
WHEEZING: 0
NAUSEA: 0
ABDOMINAL PAIN: 0
COUGH: 1
FEVER: 0
VOMITING: 0
DIARRHEA: 0
CHILLS: 0
SPUTUM PRODUCTION: 0
SORE THROAT: 0
SHORTNESS OF BREATH: 0

## 2022-06-01 NOTE — PROGRESS NOTES
Subjective:   Chelsey Spear  is a 27 y.o. female who presents for Cough (X 4 days with R ear pain.  Denies fever or chills. )      Cough  This is a new problem. The current episode started in the past 7 days. Associated symptoms include ear pain ( right). Pertinent negatives include no chills, fever, rash, sore throat, shortness of breath or wheezing ( spastic). Her past medical history is significant for environmental allergies.   Patient presents urgent care noting spastic coughing times last 4 days.  She states she has had waxing and waning respiratory infections for the last 3 to 4 weeks.  Her kids and her have had allergies as well as multiple cough and congestion.  She denies fevers or chills.  She notes over the last 4 days has had worsened spastic coughing.  She describes near posttussive emesis.  Denies anshu wheezing but does feel tight with breathing.  Complains of some right ear pain but denies left ear pain.  Denies ear drainage.  Denies sore throat.  Denies nausea vomiting abdominal pain diarrhea or rash.  Denies history of asthma, notes remote history of bronchitis.  Denies history of pneumonia or COVID.  Patient has had COVID vaccines.  Patient denies fever and states she has been taking COVID tests which have been negative.  Does have a history of bad allergic rhinitis and takes antihistamines and Flonase periodically.    Review of Systems   Constitutional: Negative for chills and fever.   HENT: Positive for congestion and ear pain ( right). Negative for sore throat.    Respiratory: Positive for cough. Negative for sputum production, shortness of breath and wheezing ( spastic).    Gastrointestinal: Negative for abdominal pain, diarrhea, nausea and vomiting.   Skin: Negative for rash.   Endo/Heme/Allergies: Positive for environmental allergies.       No Known Allergies     Objective:   /74 (BP Location: Right arm, Patient Position: Sitting, BP Cuff Size: Adult long)   Pulse (!) 114   Temp  "36.9 °C (98.4 °F) (Temporal)   Resp 14   Ht 1.575 m (5' 2\")   Wt 51.9 kg (114 lb 6.4 oz)   SpO2 98%   Breastfeeding No   BMI 20.92 kg/m²     Physical Exam  Vitals and nursing note reviewed.   Constitutional:       General: She is not in acute distress.     Appearance: She is well-developed. She is not diaphoretic.   HENT:      Head: Normocephalic and atraumatic.      Right Ear: Tympanic membrane, ear canal and external ear normal.      Left Ear: Tympanic membrane, ear canal and external ear normal.      Nose: Nose normal.      Mouth/Throat:      Pharynx: Uvula midline. Posterior oropharyngeal erythema (moderate PND ) present. No oropharyngeal exudate.      Tonsils: No tonsillar abscesses.   Eyes:      General: Lids are normal. No scleral icterus.        Right eye: No discharge.         Left eye: No discharge.      Conjunctiva/sclera: Conjunctivae normal.   Pulmonary:      Effort: Pulmonary effort is normal. No respiratory distress.      Breath sounds: No decreased breath sounds, wheezing ( spastic coughing), rhonchi or rales.   Musculoskeletal:         General: Normal range of motion.      Cervical back: Neck supple.   Lymphadenopathy:      Cervical: No cervical adenopathy.   Skin:     General: Skin is warm and dry.      Coloration: Skin is not pale.      Findings: No erythema.   Neurological:      Mental Status: She is alert and oriented to person, place, and time. She is not disoriented.   Psychiatric:         Speech: Speech normal.         Behavior: Behavior normal.     Declines point-of-care testing    Assessment/Plan:   1. Cough  - benzonatate (TESSALON) 100 MG Cap; Take 1 Capsule by mouth 3 times a day as needed for Cough.  Dispense: 60 Capsule; Refill: 0  - promethazine-dextromethorphan (PROMETHAZINE-DM) 6.25-15 MG/5ML syrup; Take 5 mL by mouth every four hours as needed for Cough.  Dispense: 120 mL; Refill: 0    2. Otalgia, unspecified laterality    3. Bronchospasm  - methylPREDNISolone (MEDROL " DOSEPAK) 4 MG Tablet Therapy Pack; Take 1 Tablet by mouth every day. Follow schedule on package instructions.  Dispense: 21 Tablet; Refill: 0  - albuterol 108 (90 Base) MCG/ACT Aero Soln inhalation aerosol; Inhale 2 Puffs every 6 hours as needed for Shortness of Breath.  Dispense: 8.5 g; Refill: 2    4. Viral URI    Other orders  - ENSKYCE 0.15-30 MG-MCG per tablet; Take 1 Tablet by mouth every day.  Supportive care is reviewed with patient/caregiver - recommend to push PO fluids and electrolytes, Nsaids/tylenol, netti pot/saline irrig, humidifier in home, Cautioned regarding potential side effects of steroid, avoid nsaids while using  Cautioned regarding potential for sedation with medication.  Return to clinic with lack of resolution or progression of symptoms.  ER precautions with any worsening symptoms are reviewed with patient/caregiver and they do express understanding      I have worn an N95 mask, gloves and eye protection for the entire encounter with this patient.     Differential diagnosis, natural history, supportive care, and indications for immediate follow-up discussed.

## 2023-02-17 ENCOUNTER — OFFICE VISIT (OUTPATIENT)
Dept: MEDICAL GROUP | Age: 28
End: 2023-02-17
Payer: COMMERCIAL

## 2023-02-17 VITALS
HEIGHT: 62 IN | OXYGEN SATURATION: 98 % | TEMPERATURE: 97 F | RESPIRATION RATE: 16 BRPM | DIASTOLIC BLOOD PRESSURE: 62 MMHG | SYSTOLIC BLOOD PRESSURE: 116 MMHG | HEART RATE: 82 BPM | BODY MASS INDEX: 20.06 KG/M2 | WEIGHT: 109 LBS

## 2023-02-17 DIAGNOSIS — Z3A.01 4 WEEKS GESTATION OF PREGNANCY: ICD-10-CM

## 2023-02-17 DIAGNOSIS — Z00.00 WELL ADULT EXAM: ICD-10-CM

## 2023-02-17 PROCEDURE — 99395 PREV VISIT EST AGE 18-39: CPT | Performed by: PHYSICIAN ASSISTANT

## 2023-02-17 RX ORDER — ESTRADIOL 2 MG/1
2 TABLET ORAL 2 TIMES DAILY
Status: ON HOLD | COMMUNITY
Start: 2023-02-04 | End: 2023-09-16

## 2023-02-17 ASSESSMENT — PATIENT HEALTH QUESTIONNAIRE - PHQ9: CLINICAL INTERPRETATION OF PHQ2 SCORE: 0

## 2023-02-17 NOTE — PROGRESS NOTES
Subjective:     CC:   Chief Complaint   Patient presents with    Annual Exam       HPI:   Chelsey Spear is a 28 y.o. female who presents for annual exam.  She is currently 4 weeks pregnant, is a surrogate.  Is followed by her OB.    Patient has GYN provider: Yes  Last Pap Smear: 2022   H/O Abnormal Pap: No  Last Tdap: 2018  Received HPV series: Yes    Exercise: Yoga 4 to 5 days a week  Diet: Vegetarian-well-rounded    No LMP recorded. Patient is pregnant.  Birth control: None  No significant bloating/fluid retention, pelvic pain, or dyspareunia. No abnormal vaginal discharge.  No breast tenderness, mass, nipple discharge, changes in size or contour, or abnormal cyclic discomfort.    OB History    Para Term  AB Living   2 1 1 0 0 1   SAB IAB Ectopic Molar Multiple Live Births   0 0 0 0 0 1      She  reports being sexually active and has had partner(s) who are male.    She  has a past medical history of Neoplasm of uncertain behavior (2020) and Ovarian cyst.    She has no past medical history of Addisons disease ([facility]), Adrenal disorder ([facility]), Allergy, Anemia, Anxiety, Blood transfusion without reported diagnosis, Clotting disorder ([facility]), Cushings syndrome ([facility]), Diabetic neuropathy ([facility]), GERD (gastroesophageal reflux disease), Hyperlipidemia, IBD (inflammatory bowel disease), Meningitis, Migraine, Muscle disorder, Osteoporosis, Parathyroid disorder ([facility]), Pituitary disease ([facility]), Sickle cell disease ([facility]), or Substance abuse ([facility]).  She  has no past surgical history on file.    Family History   Problem Relation Age of Onset    No Known Problems Mother     No Known Problems Father     No Known Problems Sister     Arthritis Maternal Grandmother     Arthritis Maternal Grandfather     No Known Problems Son     No Known Problems Daughter      Social History     Tobacco Use    Smoking status: Never    Smokeless tobacco: Never   Vaping Use    Vaping Use: Never used   Substance Use Topics    Alcohol  "use: Yes     Alcohol/week: 4.2 oz     Types: 7 Cans of beer per week     Comment: occ    Drug use: Not Currently     Types: Marijuana       There are no problems to display for this patient.    Current Outpatient Medications   Medication Sig Dispense Refill    estradiol (ESTRACE) 2 MG Tab Take 2 mg by mouth 2 times a day.      Ascorbic Acid (VITAMIN C) 1000 MG Tab Take  by mouth.      glucosamine Sulfate 500 MG Cap Take 500 mg by mouth 3 times a day, with meals.      Omega-3 Fatty Acids (FISH OIL) 1000 MG Cap capsule Take 1,000 mg by mouth 3 times a day, with meals.      Emollient (COLLAGEN EX) Apply  topically.       No current facility-administered medications for this visit.     No Known Allergies    Review of Systems   Constitutional: Negative for fever, chills and malaise/fatigue.   HENT: Negative for congestion.    Eyes: Negative for pain.   Respiratory: Negative for cough and shortness of breath.    Cardiovascular: Negative for chest pain and leg swelling.   Gastrointestinal: Negative for nausea, vomiting, abdominal pain and diarrhea.   Genitourinary: Negative for dysuria and hematuria.   Skin: Negative for rash.   Neurological: Negative for dizziness, focal weakness and headaches.   Endo/Heme/Allergies: Does not bruise/bleed easily.   Psychiatric/Behavioral: Negative for depression.  The patient is not nervous/anxious.      Objective:   /62 (BP Location: Right arm, Patient Position: Sitting, BP Cuff Size: Small adult)   Pulse 82   Temp 36.1 °C (97 °F) (Temporal)   Resp 16   Ht 1.575 m (5' 2\")   Wt 49.4 kg (109 lb)   SpO2 98%   BMI 19.94 kg/m²     Wt Readings from Last 4 Encounters:   02/17/23 49.4 kg (109 lb)   06/01/22 51.9 kg (114 lb 6.4 oz)   02/03/22 51.3 kg (113 lb)   02/16/21 56.1 kg (123 lb 9.6 oz)         Physical Exam:  Constitutional: Well-developed and well-nourished. Not diaphoretic. No distress.   Skin: Skin is warm and dry. No rash noted.  Head: Atraumatic without lesions.  Eyes: " Conjunctivae and extraocular motions are normal. Pupils are equal, round, and reactive to light. No scleral icterus.   Ears:  External ears unremarkable. Tympanic membranes clear and intact.  Nose: Nares patent. Septum midline. Turbinates without erythema nor edema. No discharge.   Mouth/Throat: Tongue normal. Oropharynx is clear and moist. Posterior pharynx without erythema or exudates.  Neck: Supple, trachea midline. Normal range of motion. No thyromegaly present. No lymphadenopathy--cervical or supraclavicular.  Cardiovascular: Regular rate and rhythm, S1 and S2 without murmur, rubs, or gallops.    Respiratory: Effort normal. Clear to auscultation throughout. No adventitious sounds.   Abdomen: Soft, non tender, and without distention. Active bowel sounds in all four quadrants. No rebound, guarding, masses or HSM.  Extremities: No cyanosis, clubbing, erythema, nor edema. Distal pulses intact and symmetric.   Musculoskeletal: All major joints AROM full in all directions without pain.  Neurological: Alert and oriented x 3. Grossly non-focal. Strength and sensation grossly intact. DTRs 2+/3 and symmetric.   Psychiatric:  Behavior, mood, and affect are appropriate.    Assessment and Plan:     1. Well adult exam  Continue with regular physical activity and good control diet.    2. 4 weeks gestation of pregnancy  Currently followed by OB.  Continue to follow-up for prenatal care.        Health maintenance:     Labs per orders  Immunizations per orders  Patient counseled about skin care, diet, supplements, and exercise.  Discussed  breast self exam, diet and exercise     Follow-up: Return in about 1 year (around 2/17/2024) for Annual PX.

## 2023-03-07 ENCOUNTER — HOSPITAL ENCOUNTER (EMERGENCY)
Facility: MEDICAL CENTER | Age: 28
End: 2023-03-07
Attending: EMERGENCY MEDICINE
Payer: COMMERCIAL

## 2023-03-07 VITALS
HEART RATE: 78 BPM | TEMPERATURE: 97.4 F | BODY MASS INDEX: 19.11 KG/M2 | OXYGEN SATURATION: 98 % | DIASTOLIC BLOOD PRESSURE: 79 MMHG | SYSTOLIC BLOOD PRESSURE: 109 MMHG | RESPIRATION RATE: 16 BRPM | WEIGHT: 104.5 LBS

## 2023-03-07 DIAGNOSIS — O21.0 MORNING SICKNESS: ICD-10-CM

## 2023-03-07 DIAGNOSIS — Z3A.01 LESS THAN 8 WEEKS GESTATION OF PREGNANCY: ICD-10-CM

## 2023-03-07 DIAGNOSIS — E86.0 DEHYDRATION: ICD-10-CM

## 2023-03-07 LAB
ALBUMIN SERPL BCP-MCNC: 4.3 G/DL (ref 3.2–4.9)
ALBUMIN/GLOB SERPL: 1.5 G/DL
ALP SERPL-CCNC: 74 U/L (ref 30–99)
ALT SERPL-CCNC: 14 U/L (ref 2–50)
ANION GAP SERPL CALC-SCNC: 13 MMOL/L (ref 7–16)
AST SERPL-CCNC: 16 U/L (ref 12–45)
B-HCG SERPL-ACNC: ABNORMAL MIU/ML (ref 0–5)
BASOPHILS # BLD AUTO: 0.5 % (ref 0–1.8)
BASOPHILS # BLD: 0.02 K/UL (ref 0–0.12)
BILIRUB SERPL-MCNC: 0.4 MG/DL (ref 0.1–1.5)
BUN SERPL-MCNC: 15 MG/DL (ref 8–22)
CALCIUM ALBUM COR SERPL-MCNC: 9.3 MG/DL (ref 8.5–10.5)
CALCIUM SERPL-MCNC: 9.5 MG/DL (ref 8.5–10.5)
CHLORIDE SERPL-SCNC: 103 MMOL/L (ref 96–112)
CO2 SERPL-SCNC: 23 MMOL/L (ref 20–33)
CREAT SERPL-MCNC: 0.43 MG/DL (ref 0.5–1.4)
EOSINOPHIL # BLD AUTO: 0.03 K/UL (ref 0–0.51)
EOSINOPHIL NFR BLD: 0.8 % (ref 0–6.9)
ERYTHROCYTE [DISTWIDTH] IN BLOOD BY AUTOMATED COUNT: 37.6 FL (ref 35.9–50)
GFR SERPLBLD CREATININE-BSD FMLA CKD-EPI: 135 ML/MIN/1.73 M 2
GLOBULIN SER CALC-MCNC: 2.9 G/DL (ref 1.9–3.5)
GLUCOSE SERPL-MCNC: 84 MG/DL (ref 65–99)
HCT VFR BLD AUTO: 40.3 % (ref 37–47)
HGB BLD-MCNC: 14 G/DL (ref 12–16)
IMM GRANULOCYTES # BLD AUTO: 0.01 K/UL (ref 0–0.11)
IMM GRANULOCYTES NFR BLD AUTO: 0.3 % (ref 0–0.9)
LYMPHOCYTES # BLD AUTO: 1.6 K/UL (ref 1–4.8)
LYMPHOCYTES NFR BLD: 42.6 % (ref 22–41)
MCH RBC QN AUTO: 29 PG (ref 27–33)
MCHC RBC AUTO-ENTMCNC: 34.7 G/DL (ref 33.6–35)
MCV RBC AUTO: 83.4 FL (ref 81.4–97.8)
MONOCYTES # BLD AUTO: 0.39 K/UL (ref 0–0.85)
MONOCYTES NFR BLD AUTO: 10.4 % (ref 0–13.4)
NEUTROPHILS # BLD AUTO: 1.71 K/UL (ref 2–7.15)
NEUTROPHILS NFR BLD: 45.4 % (ref 44–72)
NRBC # BLD AUTO: 0 K/UL
NRBC BLD-RTO: 0 /100 WBC
PLATELET # BLD AUTO: 249 K/UL (ref 164–446)
PMV BLD AUTO: 9.4 FL (ref 9–12.9)
POTASSIUM SERPL-SCNC: 3.9 MMOL/L (ref 3.6–5.5)
PROT SERPL-MCNC: 7.2 G/DL (ref 6–8.2)
RBC # BLD AUTO: 4.83 M/UL (ref 4.2–5.4)
SODIUM SERPL-SCNC: 139 MMOL/L (ref 135–145)
WBC # BLD AUTO: 3.8 K/UL (ref 4.8–10.8)

## 2023-03-07 PROCEDURE — 80053 COMPREHEN METABOLIC PANEL: CPT

## 2023-03-07 PROCEDURE — 85025 COMPLETE CBC W/AUTO DIFF WBC: CPT

## 2023-03-07 PROCEDURE — 700111 HCHG RX REV CODE 636 W/ 250 OVERRIDE (IP): Performed by: EMERGENCY MEDICINE

## 2023-03-07 PROCEDURE — 84702 CHORIONIC GONADOTROPIN TEST: CPT

## 2023-03-07 PROCEDURE — 700105 HCHG RX REV CODE 258: Performed by: EMERGENCY MEDICINE

## 2023-03-07 PROCEDURE — 96374 THER/PROPH/DIAG INJ IV PUSH: CPT

## 2023-03-07 PROCEDURE — 99284 EMERGENCY DEPT VISIT MOD MDM: CPT

## 2023-03-07 PROCEDURE — 36415 COLL VENOUS BLD VENIPUNCTURE: CPT

## 2023-03-07 RX ORDER — METOCLOPRAMIDE HYDROCHLORIDE 5 MG/ML
10 INJECTION INTRAMUSCULAR; INTRAVENOUS ONCE
Status: COMPLETED | OUTPATIENT
Start: 2023-03-07 | End: 2023-03-07

## 2023-03-07 RX ORDER — ONDANSETRON 4 MG/1
4 TABLET, ORALLY DISINTEGRATING ORAL EVERY 8 HOURS PRN
Qty: 30 TABLET | Refills: 0 | Status: ON HOLD | OUTPATIENT
Start: 2023-03-07 | End: 2023-09-16

## 2023-03-07 RX ORDER — DOXYLAMINE SUCCINATE AND PYRIDOXINE HYDROCHLORIDE, DELAYED RELEASE TABLETS 10 MG/10 MG 10; 10 MG/1; MG/1
1-2 TABLET, DELAYED RELEASE ORAL
Qty: 60 TABLET | Refills: 0 | Status: SHIPPED | OUTPATIENT
Start: 2023-03-07 | End: 2023-04-25 | Stop reason: SDUPTHER

## 2023-03-07 RX ORDER — SODIUM CHLORIDE, SODIUM LACTATE, POTASSIUM CHLORIDE, CALCIUM CHLORIDE 600; 310; 30; 20 MG/100ML; MG/100ML; MG/100ML; MG/100ML
1000 INJECTION, SOLUTION INTRAVENOUS ONCE
Status: COMPLETED | OUTPATIENT
Start: 2023-03-07 | End: 2023-03-07

## 2023-03-07 RX ADMIN — METOCLOPRAMIDE 10 MG: 5 INJECTION, SOLUTION INTRAMUSCULAR; INTRAVENOUS at 13:45

## 2023-03-07 RX ADMIN — SODIUM CHLORIDE, POTASSIUM CHLORIDE, SODIUM LACTATE AND CALCIUM CHLORIDE 1000 ML: 600; 310; 30; 20 INJECTION, SOLUTION INTRAVENOUS at 13:45

## 2023-03-07 NOTE — ED PROVIDER NOTES
"ER Provider Note    Scribed for Jeff Wheatley M.d. by Devang Mary. 3/7/2023  1:11 PM    Primary Care Provider: Rocío Bernstein P.A.-C.    CHIEF COMPLAINT  Chief Complaint   Patient presents with    N/V    Pregnancy     Reports 7 weeks pregnant     LIMITATION TO HISTORY   None    HPI/ROS  OUTSIDE HISTORIAN(S):  None    EXTERNAL RECORDS REVIEWED  Outpatient Notes The patient was last seen by her PCP on 23 for an annual exam.    Chelsey Spear is a 28 y.o. female who presents to the ED for evaluation of vomiting in the context of pregnancy onset 1 day ago. She is  without any miscarriages. The patient states that she is a surrogate and underwent IVF on 23. She notes undergoing an ultrasound at 6 weeks which showed a single intrauterine pregnancy. The patient is approximately 7 weeks and 3 days. She is on a hormonal treatment consisting of Progesterone injections and PO estradiol, but has been having issues tolerating the estradiol secondary her symptoms. The patient states that she became increasingly nauseous with several episodes of emesis 1 day ago which she attributes to her current pregnancy. After speaking with her IVF treatment team today, they ultimately advised she visit the ED over concerns of dehydration. Associated symptoms include nausea and decreased PO intake. The patient denies any fever, abnormal vaginal bleeding, dysuria, or abdominal pain. She has been attempting to medicate with Zofran, but has not been able to tolerate it. The patient has been attempting to have smaller meals, consuming \"preggie pops\", ginger ale, sprite, and wearing nausea bands with minimal alleviation to her symptoms. Her nausea is exacerbated with abdominal pressure.     PAST MEDICAL HISTORY  Past Medical History:   Diagnosis Date    Neoplasm of uncertain behavior 2020    Ovarian cyst        SURGICAL HISTORY  History reviewed. No pertinent surgical history.    FAMILY HISTORY  Family History   Problem " Relation Age of Onset    No Known Problems Mother     No Known Problems Father     No Known Problems Sister     Arthritis Maternal Grandmother     Arthritis Maternal Grandfather     No Known Problems Son     No Known Problems Daughter        SOCIAL HISTORY   reports that she has never smoked. She has never used smokeless tobacco. She reports current alcohol use of about 4.2 oz per week. She reports that she does not currently use drugs after having used the following drugs: Marijuana.    CURRENT MEDICATIONS  Previous Medications    ASCORBIC ACID (VITAMIN C) 1000 MG TAB    Take  by mouth.    EMOLLIENT (COLLAGEN EX)    Apply  topically.    ESTRADIOL (ESTRACE) 2 MG TAB    Take 2 mg by mouth 2 times a day.    GLUCOSAMINE SULFATE 500 MG CAP    Take 500 mg by mouth 3 times a day, with meals.    OMEGA-3 FATTY ACIDS (FISH OIL) 1000 MG CAP CAPSULE    Take 1,000 mg by mouth 3 times a day, with meals.       ALLERGIES  Patient has no known allergies.    PHYSICAL EXAM  BP 98/76   Pulse 84   Temp 36.4 °C (97.5 °F) (Temporal)   Resp 18   Wt 47.4 kg (104 lb 8 oz)   SpO2 97%   BMI 19.11 kg/m²   Gen: Alert, no acute distress  HEENT: ATNC, Dry mucous membranes  Neck: trachea midline  Resp: no respiratory distress  CV: Regular rate and rhythm, No JVD  Abd: Soft, non-tender, non-distended  Ext: No deformities  Psych: normal mood  Neuro: speech fluent     DIAGNOSTIC STUDIES & PROCEDURES    Labs:   Labs Reviewed   CBC WITH DIFFERENTIAL - Abnormal; Notable for the following components:       Result Value    WBC 3.8 (*)     Lymphocytes 42.60 (*)     Neutrophils (Absolute) 1.71 (*)     All other components within normal limits   COMP METABOLIC PANEL - Abnormal; Notable for the following components:    Creatinine 0.43 (*)     All other components within normal limits   HCG QUANTITATIVE - Abnormal; Notable for the following components:    Bhcg 82272.0 (*)     All other components within normal limits   CORRECTED CALCIUM   ESTIMATED GFR      All labs reviewed by me.    COURSE & MEDICAL DECISION MAKING    ED Observation Status? Yes; I am placing the patient in to an observation status due to a diagnostic uncertainty as well as therapeutic intensity. Patient placed in observation status at 1:11 PM, 3/7/2023.     Observation plan is as follows: Pending screening labs, nausea medication, fluid resuscitation, and reassessment.    Upon Reevaluation, the patient's condition has: Improved; and will be discharged.    Patient discharged from ED Observation status at 2:53 PM on 3/7/2023.    INITIAL ASSESSMENT AND PLAN  Care Narrative: Patient in the first trimester of pregnancy presents with vomiting, dehydration.  She was given IV fluids for this, treated with Reglan and ultimately able to tolerate oral intake.  Patient's labs demonstrate no electrolyte abnormalities, no significant findings on CBC.  Beta hCG is elevated as expected.  She demonstrates no abdominal pain, no vaginal bleeding, low suspicion for ectopic pregnancy, heterotopic pregnancy, or miscarriage.  Patient will be prescribed Diclegis, as well as ODT ondansetron.      1:11 PM - Patient seen and evaluated at bedside. Chelsey Spear is a 28 y.o. female who presents with nausea/vomiting in the context of 1st trimester pregnancy. Patient will be treated with Reglan 10 mg and LR Bolus for her symptoms. Ordered labs to evaluate. She understands and agrees to the plan of care.    2:53 PM - Patient was reevaluated at bedside, she reports that her nausea has markedly improved following the administration of medication and infusion of fluids. Explained lab results with the patient and informed her that they were grossly reassuring. I have included prescriptions for Unisom and Zofran ODT. Advised her to take daily B6 vitamins. She can also eat tung chew or gummies for nausea. Discussed plan for discharge; I advised the patient to follow-up with her OB/Gyn and PCP as soon as possible, and to return to  the [facility] ED with any new or worsening symptoms, including ongoing vomiting, dehydration, abdominal pain, vaginal bleeding or other concerns. Patient was given the opportunity for questions. I addressed all questions or concerns at this time and they verbalize agreement to the plan of care.     HYDRATION: Based on the patient's presentation of Dehydration the patient was given IV fluids. IV Hydration was used because oral hydration was not adequate alone. Upon recheck following hydration, the patient was improved.                 DISPOSITION AND DISCUSSIONS  I have discussed management of the patient with the following physicians and SANKET's: None    Discussion of management with other [facility] or appropriate source(s): None     Escalation of care considered, and ultimately not performed: diagnostic imaging.    Barriers to care at this time, including but not limited to: None.     Decision tools and prescription drugs considered including, but not limited to:  Anti-emetics including Zofran and Unisom .    The patient will return for new or worsening symptoms and is stable at the time of discharge.    The patient is referred to a primary physician for blood pressure management, diabetic screening, and for all other preventative health concerns.    DISPOSITION:  Patient will be discharged home in stable condition.    FOLLOW UP:  Rocío Bernstein P.A.-C.  [address] [place] [id number] [phone]    Schedule an appointment as soon as possible for a visit       Your OB/Gyn    Schedule an appointment as soon as possible for a visit       [facility], Emergency Dept  [address] [id number] [phone]    If symptoms worsen    OUTPATIENT MEDICATIONS:  New Prescriptions    DOXYLAMINE-PYRIDOXINE 10-10 MG TABLET DELAYED RESPONSE DELAYED-RELEASE TABLET    Take 1-2 Tablets by mouth at bedtime.    ONDANSETRON (ZOFRAN ODT) 4 MG TABLET DISPERSIBLE    Take 1 Tablet by mouth every 8 hours as  needed for Nausea/Vomiting.     FINAL IMPRESSION   1. Morning sickness    2. Less than 8 weeks gestation of pregnancy    3. Dehydration         I, Devang Mary (Scribe), am scribing for, and in the presence of, Jeff Wheatley M.D..    Electronically signed by: Devang Mary (Scribe), 3/7/2023    Jeff LEYVA M.D. personally performed the services described in this documentation, as scribed by Devang Mary in my presence, and it is both accurate and complete.    The note accurately reflects work and decisions made by me.  Jeff Wheatley M.D.  3/7/2023  8:08 PM

## 2023-03-07 NOTE — ED NOTES
ERP at bedside. Pt provided water and emesis bag for PO challenge. Reporting that she feels better.

## 2023-03-07 NOTE — ED NOTES
Patient discharged home per ERP.  Discharge teaching and education discussed with patient. POC discussed.   Patient verbalized understanding of discharge teaching and education. No other questions at this time.     RX x 2 sent to pharmacy by ERP.  PIV removed.     VSS. Patient alert and oriented. Patient arranged ride for self. Able to ambulate off unit safely with steady gait.

## 2023-03-07 NOTE — DISCHARGE INSTRUCTIONS
You were seen in the emergency department for nausea, vomiting, dehydration in the setting of early pregnancy.  Your labs were reassuring.    For nausea, you are being prescribed a medication to treat this.  If it is too expensive, you may take you may take Unisom (doxylamine) 100mg at bedtime. Take vitamin B6 (pyridoxime) 50-100mg daily as well. This combination is safe in pregnancy and effective at controlling nausea.     You may also try tung chews or candies for nausea.     You are also being prescribed a nausea medicine for breakthrough nausea that dissolves in the mouth.    Please return to the emergency department or seek medical attention if you develop:  Ongoing vomiting, dehydration, abdominal pains, vaginal bleeding, any other new or concerning findings

## 2023-04-25 ENCOUNTER — HOSPITAL ENCOUNTER (EMERGENCY)
Facility: MEDICAL CENTER | Age: 28
End: 2023-04-25
Attending: EMERGENCY MEDICINE
Payer: COMMERCIAL

## 2023-04-25 VITALS
WEIGHT: 102.95 LBS | BODY MASS INDEX: 18.83 KG/M2 | SYSTOLIC BLOOD PRESSURE: 98 MMHG | TEMPERATURE: 97.5 F | DIASTOLIC BLOOD PRESSURE: 65 MMHG | HEART RATE: 100 BPM | OXYGEN SATURATION: 100 % | RESPIRATION RATE: 14 BRPM

## 2023-04-25 DIAGNOSIS — O21.0 HYPEREMESIS GRAVIDARUM: ICD-10-CM

## 2023-04-25 DIAGNOSIS — O21.0 MORNING SICKNESS: ICD-10-CM

## 2023-04-25 LAB
ALBUMIN SERPL BCP-MCNC: 4.1 G/DL (ref 3.2–4.9)
ALBUMIN/GLOB SERPL: 1.4 G/DL
ALP SERPL-CCNC: 65 U/L (ref 30–99)
ALT SERPL-CCNC: 13 U/L (ref 2–50)
ANION GAP SERPL CALC-SCNC: 15 MMOL/L (ref 7–16)
AST SERPL-CCNC: 20 U/L (ref 12–45)
BASOPHILS # BLD AUTO: 0.5 % (ref 0–1.8)
BASOPHILS # BLD: 0.03 K/UL (ref 0–0.12)
BILIRUB SERPL-MCNC: 0.4 MG/DL (ref 0.1–1.5)
BUN SERPL-MCNC: 11 MG/DL (ref 8–22)
CALCIUM ALBUM COR SERPL-MCNC: 9.3 MG/DL (ref 8.5–10.5)
CALCIUM SERPL-MCNC: 9.4 MG/DL (ref 8.5–10.5)
CHLORIDE SERPL-SCNC: 103 MMOL/L (ref 96–112)
CO2 SERPL-SCNC: 21 MMOL/L (ref 20–33)
CREAT SERPL-MCNC: 0.31 MG/DL (ref 0.5–1.4)
EOSINOPHIL # BLD AUTO: 0.05 K/UL (ref 0–0.51)
EOSINOPHIL NFR BLD: 0.8 % (ref 0–6.9)
ERYTHROCYTE [DISTWIDTH] IN BLOOD BY AUTOMATED COUNT: 42.6 FL (ref 35.9–50)
GFR SERPLBLD CREATININE-BSD FMLA CKD-EPI: 146 ML/MIN/1.73 M 2
GLOBULIN SER CALC-MCNC: 2.9 G/DL (ref 1.9–3.5)
GLUCOSE SERPL-MCNC: 76 MG/DL (ref 65–99)
HCT VFR BLD AUTO: 39.9 % (ref 37–47)
HGB BLD-MCNC: 13.6 G/DL (ref 12–16)
IMM GRANULOCYTES # BLD AUTO: 0.02 K/UL (ref 0–0.11)
IMM GRANULOCYTES NFR BLD AUTO: 0.3 % (ref 0–0.9)
LYMPHOCYTES # BLD AUTO: 1.78 K/UL (ref 1–4.8)
LYMPHOCYTES NFR BLD: 27.5 % (ref 22–41)
MAGNESIUM SERPL-MCNC: 2 MG/DL (ref 1.5–2.5)
MCH RBC QN AUTO: 29.5 PG (ref 27–33)
MCHC RBC AUTO-ENTMCNC: 34.1 G/DL (ref 33.6–35)
MCV RBC AUTO: 86.6 FL (ref 81.4–97.8)
MONOCYTES # BLD AUTO: 0.46 K/UL (ref 0–0.85)
MONOCYTES NFR BLD AUTO: 7.1 % (ref 0–13.4)
NEUTROPHILS # BLD AUTO: 4.14 K/UL (ref 2–7.15)
NEUTROPHILS NFR BLD: 63.8 % (ref 44–72)
NRBC # BLD AUTO: 0 K/UL
NRBC BLD-RTO: 0 /100 WBC
PLATELET # BLD AUTO: 195 K/UL (ref 164–446)
PMV BLD AUTO: 10.3 FL (ref 9–12.9)
POTASSIUM SERPL-SCNC: 3.9 MMOL/L (ref 3.6–5.5)
PROT SERPL-MCNC: 7 G/DL (ref 6–8.2)
RBC # BLD AUTO: 4.61 M/UL (ref 4.2–5.4)
SODIUM SERPL-SCNC: 139 MMOL/L (ref 135–145)
WBC # BLD AUTO: 6.5 K/UL (ref 4.8–10.8)

## 2023-04-25 PROCEDURE — 700105 HCHG RX REV CODE 258: Performed by: EMERGENCY MEDICINE

## 2023-04-25 PROCEDURE — 36415 COLL VENOUS BLD VENIPUNCTURE: CPT

## 2023-04-25 PROCEDURE — 96375 TX/PRO/DX INJ NEW DRUG ADDON: CPT

## 2023-04-25 PROCEDURE — 80053 COMPREHEN METABOLIC PANEL: CPT

## 2023-04-25 PROCEDURE — 700111 HCHG RX REV CODE 636 W/ 250 OVERRIDE (IP): Performed by: EMERGENCY MEDICINE

## 2023-04-25 PROCEDURE — 99284 EMERGENCY DEPT VISIT MOD MDM: CPT

## 2023-04-25 PROCEDURE — 96374 THER/PROPH/DIAG INJ IV PUSH: CPT

## 2023-04-25 PROCEDURE — 83735 ASSAY OF MAGNESIUM: CPT

## 2023-04-25 PROCEDURE — 85025 COMPLETE CBC W/AUTO DIFF WBC: CPT

## 2023-04-25 RX ORDER — METOCLOPRAMIDE 10 MG/1
10 TABLET ORAL 4 TIMES DAILY PRN
Qty: 40 TABLET | Refills: 0 | Status: ON HOLD | OUTPATIENT
Start: 2023-04-25 | End: 2023-09-16

## 2023-04-25 RX ORDER — METOCLOPRAMIDE HYDROCHLORIDE 5 MG/ML
10 INJECTION INTRAMUSCULAR; INTRAVENOUS ONCE
Status: COMPLETED | OUTPATIENT
Start: 2023-04-25 | End: 2023-04-25

## 2023-04-25 RX ORDER — ONDANSETRON 4 MG/1
4 TABLET, ORALLY DISINTEGRATING ORAL EVERY 8 HOURS PRN
Qty: 10 TABLET | Refills: 0 | Status: ON HOLD | OUTPATIENT
Start: 2023-04-25 | End: 2023-09-16

## 2023-04-25 RX ORDER — SODIUM CHLORIDE 9 MG/ML
1000 INJECTION, SOLUTION INTRAVENOUS ONCE
Status: COMPLETED | OUTPATIENT
Start: 2023-04-25 | End: 2023-04-25

## 2023-04-25 RX ORDER — ONDANSETRON 2 MG/ML
4 INJECTION INTRAMUSCULAR; INTRAVENOUS ONCE
Status: COMPLETED | OUTPATIENT
Start: 2023-04-25 | End: 2023-04-25

## 2023-04-25 RX ORDER — ONDANSETRON 4 MG/1
4 TABLET, ORALLY DISINTEGRATING ORAL EVERY 8 HOURS PRN
Qty: 10 TABLET | Refills: 0 | Status: SHIPPED | OUTPATIENT
Start: 2023-04-25 | End: 2023-04-25 | Stop reason: SDUPTHER

## 2023-04-25 RX ORDER — DOXYLAMINE SUCCINATE AND PYRIDOXINE HYDROCHLORIDE, DELAYED RELEASE TABLETS 10 MG/10 MG 10; 10 MG/1; MG/1
1-2 TABLET, DELAYED RELEASE ORAL
Qty: 60 TABLET | Refills: 0 | Status: ON HOLD | OUTPATIENT
Start: 2023-04-25 | End: 2023-09-16

## 2023-04-25 RX ADMIN — ONDANSETRON HYDROCHLORIDE 4 MG: 2 SOLUTION INTRAMUSCULAR; INTRAVENOUS at 13:35

## 2023-04-25 RX ADMIN — SODIUM CHLORIDE 1000 ML: 9 INJECTION, SOLUTION INTRAVENOUS at 13:35

## 2023-04-25 RX ADMIN — METOCLOPRAMIDE 10 MG: 5 INJECTION, SOLUTION INTRAMUSCULAR; INTRAVENOUS at 13:35

## 2023-04-25 ASSESSMENT — FIBROSIS 4 INDEX: FIB4 SCORE: 0.5

## 2023-04-25 ASSESSMENT — LIFESTYLE VARIABLES: DO YOU DRINK ALCOHOL: NO

## 2023-04-25 NOTE — ED PROVIDER NOTES
ER Provider Note    Scribed for Codey Thompson M.d. by Azar Rincon. 4/25/2023  1:03 PM    Primary Care Provider: Rocío Bernstein P.A.-C.    CHIEF COMPLAINT  Chief Complaint   Patient presents with    N/V    Pregnancy     14 weeks     EXTERNAL RECORDS REVIEWED  Other Patient presented to the ED on 3/7/2023 for nausea and vomiting. 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 female who presents to the ED for evaluation of nausea and vomiting, onset one day ago. She states she has not been able to keep any food or fluids down for the past 24 hours. She is currently 14 weeks pregnant as a surrogate, G:3 P:2. She denies any abdominal pain, vaginal pain, vaginal bleeding, dysuria, hematuria, diarrhea, or other medical complaints at this time. She also denies any known exposure to bad food recently. She has tried taking Zofran and tung candies to no alleviation, as she is no able to tolerate these either. She is followed by Dr. Fernandez (OB/GYN). She is allergic to amoxicillin, but has no other known allergies to medications.       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 No pertinent surgical history.    FAMILY HISTORY  Family History   Problem Relation Age of Onset    No Known Problems Mother     No Known Problems Father     No Known Problems Sister     Arthritis Maternal Grandmother     Arthritis Maternal Grandfather     No Known Problems Son     No Known Problems Daughter        SOCIAL HISTORY   reports that she has never smoked. She has never used smokeless tobacco. She reports current alcohol use of about 4.2 oz per week. She reports that she does not currently use drugs after having used the following drugs: Marijuana.    CURRENT MEDICATIONS  Previous Medications     ASCORBIC ACID (VITAMIN C) 1000 MG TAB    Take  by mouth.    DOXYLAMINE-PYRIDOXINE 10-10 MG TABLET DELAYED RESPONSE DELAYED-RELEASE TABLET    Take 1-2 Tablets by mouth at bedtime.    EMOLLIENT (COLLAGEN EX)    Apply  topically.    ESTRADIOL (ESTRACE) 2 MG TAB    Take 2 mg by mouth 2 times a day.    GLUCOSAMINE SULFATE 500 MG CAP    Take 500 mg by mouth 3 times a day, with meals.    OMEGA-3 FATTY ACIDS (FISH OIL) 1000 MG CAP CAPSULE    Take 1,000 mg by mouth 3 times a day, with meals.    ONDANSETRON (ZOFRAN ODT) 4 MG TABLET DISPERSIBLE    Take 1 Tablet by mouth every 8 hours as needed for Nausea/Vomiting.       ALLERGIES  Amoxicillin    PHYSICAL EXAM  BP 92/68   Pulse 82   Temp 36.3 °C (97.3 °F) (Temporal)   Resp 14   Wt 46.7 kg (102 lb 15.3 oz)   SpO2 99%   BMI 18.83 kg/m²   Constitutional: Well developed, Well nourished, Mild distress.   HENT: Normocephalic, Atraumatic, Slightly dry mucous membranes, No oral exudates.   Eyes: Conjunctiva normal, No discharge.    Cardiovascular: Normal heart rate, Normal rhythm, No murmurs, equal pulses.   Pulmonary: Normal breath sounds, No respiratory distress, No wheezing, No rales, No rhonchi.  Chest: No chest wall tenderness or deformity.   Abdomen:Soft, No tenderness, No masses, no rebound, no guarding.   Back: No CVA tenderness.   Musculoskeletal: No major deformities noted, No tenderness.   Skin: Warm, Dry, No erythema, No rash.   Neurologic: Alert & oriented x 3, Normal motor function,  No focal deficits noted.   Psychiatric: Affect normal, Judgment normal, Mood normal.       DIAGNOSTIC STUDIES    Labs:   Results for orders placed or performed during the hospital encounter of 04/25/23   CBC WITH DIFFERENTIAL   Result Value Ref Range    WBC 6.5 4.8 - 10.8 K/uL    RBC 4.61 4.20 - 5.40 M/uL    Hemoglobin 13.6 12.0 - 16.0 g/dL    Hematocrit 39.9 37.0 - 47.0 %    MCV 86.6 81.4 - 97.8 fL    MCH 29.5 27.0 - 33.0 pg    MCHC 34.1 33.6 - 35.0 g/dL    RDW 42.6 35.9 - 50.0 fL     Platelet Count 195 164 - 446 K/uL    MPV 10.3 9.0 - 12.9 fL    Neutrophils-Polys 63.80 44.00 - 72.00 %    Lymphocytes 27.50 22.00 - 41.00 %    Monocytes 7.10 0.00 - 13.40 %    Eosinophils 0.80 0.00 - 6.90 %    Basophils 0.50 0.00 - 1.80 %    Immature Granulocytes 0.30 0.00 - 0.90 %    Nucleated RBC 0.00 /100 WBC    Neutrophils (Absolute) 4.14 2.00 - 7.15 K/uL    Lymphs (Absolute) 1.78 1.00 - 4.80 K/uL    Monos (Absolute) 0.46 0.00 - 0.85 K/uL    Eos (Absolute) 0.05 0.00 - 0.51 K/uL    Baso (Absolute) 0.03 0.00 - 0.12 K/uL    Immature Granulocytes (abs) 0.02 0.00 - 0.11 K/uL    NRBC (Absolute) 0.00 K/uL   COMP METABOLIC PANEL   Result Value Ref Range    Sodium 139 135 - 145 mmol/L    Potassium 3.9 3.6 - 5.5 mmol/L    Chloride 103 96 - 112 mmol/L    Co2 21 20 - 33 mmol/L    Anion Gap 15.0 7.0 - 16.0    Glucose 76 65 - 99 mg/dL    Bun 11 8 - 22 mg/dL    Creatinine 0.31 (L) 0.50 - 1.40 mg/dL    Calcium 9.4 8.5 - 10.5 mg/dL    AST(SGOT) 20 12 - 45 U/L    ALT(SGPT) 13 2 - 50 U/L    Alkaline Phosphatase 65 30 - 99 U/L    Total Bilirubin 0.4 0.1 - 1.5 mg/dL    Albumin 4.1 3.2 - 4.9 g/dL    Total Protein 7.0 6.0 - 8.2 g/dL    Globulin 2.9 1.9 - 3.5 g/dL    A-G Ratio 1.4 g/dL   MAGNESIUM   Result Value Ref Range    Magnesium 2.0 1.5 - 2.5 mg/dL   CORRECTED CALCIUM   Result Value Ref Range    Correct Calcium 9.3 8.5 - 10.5 mg/dL   ESTIMATED GFR   Result Value Ref Range    GFR (CKD-EPI) 146 >60 mL/min/1.73 m 2        COURSE & MEDICAL DECISION MAKING     ED Observation Status? no    INITIAL ASSESSMENT, COURSE AND PLAN  Care Narrative:     1:03 PM - Patient seen and examined at bedside. Discussed plan of care, including lab work and reassessment. Patient agrees to the plan of care. The patient will be resuscitated with 1L NS IV and medicated with Reglan injection 10 mg and Zofran injection 4 mg. Ordered for CBC with differentia, CMP, UA, and Magnesium to evaluate her symptoms. Differential diagnoses include but not limited to:  Hyperemesis gravidarum, Electrolyte abnormality, Dehydration, UTI.       2:15 PM Patient's nurse informed me that the patient states she is feeling improved after medication and would like to be discharged at this time as she needs to pick-up her child from school.     2:22 PM - Patient reevaluated at bedside. The patient informs me they feel improved following medication and fluid administration. Discussed plan for discharge; I advised the patient to follow-up with her PCP as soon possible and her OB/GYN in one week, and to return to the [facility] ED with any new or worsening symptoms. Patient was given the opportunity for questions. I addressed all questions or concerns and the patient is stable for discharge at this time. Patient verbalizes understanding and support with my plan for discharge.       HYDRATION: Based on the patient's presentation of Acute Vomiting the patient was given IV fluids. IV Hydration was used because oral hydration was not adequate alone. Upon recheck following hydration, the patient was improved.       PROBLEM LIST  Problem #1 hyperemesis gravidarum patient presents with 24 hours of severe vomiting.  She was given IV fluids here as well as Reglan and Zofran with that she is able to tolerate fluids.  Patient is asking to leave because she needs to  her kids.  She was unable to give us a urine sample before hand.  Her electrolytes are unremarkable just shows some very mild dehydration.  We will discharge the patient home with Zofran and Reglan to help with her vomiting.    DISPOSITION AND DISCUSSIONS  I have discussed management of the patient with the following physicians and SANKET's:  None    Discussion of management with other QHP or appropriate source(s):  None      Barriers to care at this time, including but not limited to:  None .     Decision tools and prescription drugs considered including, but not limited to:  We will give the patient antiemetics .    The patient will return  for new or worsening symptoms and is stable at the time of discharge.      DISPOSITION:  Patient will be discharged home in stable condition.    FOLLOW UP:  [facility] [address] [place] [id number] [phone]    Schedule an appointment as soon as possible for a visit       Your Ob    Schedule an appointment as soon as possible for a visit in 1 week        OUTPATIENT MEDICATIONS:  Discharge Medication List as of 4/25/2023  2:18 PM        START taking these medications    Details   metoclopramide (REGLAN) 10 MG Tab Take 1 Tablet by mouth 4 times a day as needed (vomiting)., Disp-40 Tablet, R-0, Normal              FINAL DIANGOSIS  1. Hyperemesis gravidarum    2. Morning sickness        Azar LEYVA (Scribe), am scribing for, and in the presence of, LEÓN Rausch*.    Electronically signed by: Azar Rincon (Scribe), 4/25/2023    ICodey M.* personally performed the services described in this documentation, as scribed by Azar Rincon in my presence, and it is both accurate and complete.      The note accurately reflects work and decisions made by me.  Codey Thompson M.D.  4/26/2023  2:30 PM

## 2023-04-25 NOTE — ED NOTES
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

## 2023-04-25 NOTE — DISCHARGE INSTRUCTIONS
Return the emergency department if you have abdominal pain, fever, blood in vomit or blood in stool or unable to keep anything down.  Try small sips of water once every 5 minutes this will help keep you hydrated.

## 2023-04-25 NOTE — ED TRIAGE NOTES
"Chief Complaint   Patient presents with    N/V    Pregnancy     14 weeks     Pt reports that she has monica \"unable to eat or drink anything for the last 24 hours.\"  NAD, PWD. VSS  /71   Pulse 85   Temp 36.3 °C (97.3 °F) (Temporal)   Resp 12   Wt 46.7 kg (102 lb 15.3 oz)   SpO2 100%    Pt informed of wait times. Educated on triage process.  Asked to return to triage RN for any new or worsening of symptoms. Thanked for patience.      "

## 2023-04-25 NOTE — ED NOTES
Patient discharged home per ERP.  Discharge teaching and education discussed with patient. POC discussed.   Patient verbalized understanding of discharge teaching and education. No other questions at this time.     RX x 3 sent to pharmacy by ERP.  PIV removed.     VSS. Patient alert and oriented. Patient arranged ride for self. Able to ambulate off unit safely with steady gait.

## 2023-09-14 ENCOUNTER — APPOINTMENT (OUTPATIENT)
Dept: RADIOLOGY | Facility: MEDICAL CENTER | Age: 28
End: 2023-09-14
Attending: OBSTETRICS & GYNECOLOGY
Payer: COMMERCIAL

## 2023-09-14 ENCOUNTER — HOSPITAL ENCOUNTER (INPATIENT)
Facility: MEDICAL CENTER | Age: 28
LOS: 2 days | End: 2023-09-16
Attending: OBSTETRICS & GYNECOLOGY | Admitting: OBSTETRICS & GYNECOLOGY
Payer: COMMERCIAL

## 2023-09-14 LAB
BASOPHILS # BLD AUTO: 0.5 % (ref 0–1.8)
BASOPHILS # BLD: 0.04 K/UL (ref 0–0.12)
CRYSTALS AMN MICRO: NORMAL
EOSINOPHIL # BLD AUTO: 0.11 K/UL (ref 0–0.51)
EOSINOPHIL NFR BLD: 1.3 % (ref 0–6.9)
ERYTHROCYTE [DISTWIDTH] IN BLOOD BY AUTOMATED COUNT: 44.2 FL (ref 35.9–50)
GP B STREP DNA SPEC QL NAA+PROBE: NEGATIVE
HCT VFR BLD AUTO: 40.4 % (ref 37–47)
HGB BLD-MCNC: 13.1 G/DL (ref 12–16)
HOLDING TUBE BB 8507: NORMAL
IMM GRANULOCYTES # BLD AUTO: 0.05 K/UL (ref 0–0.11)
IMM GRANULOCYTES NFR BLD AUTO: 0.6 % (ref 0–0.9)
LYMPHOCYTES # BLD AUTO: 2 K/UL (ref 1–4.8)
LYMPHOCYTES NFR BLD: 24.5 % (ref 22–41)
MCH RBC QN AUTO: 28.8 PG (ref 27–33)
MCHC RBC AUTO-ENTMCNC: 32.4 G/DL (ref 32.2–35.5)
MCV RBC AUTO: 88.8 FL (ref 81.4–97.8)
MONOCYTES # BLD AUTO: 0.67 K/UL (ref 0–0.85)
MONOCYTES NFR BLD AUTO: 8.2 % (ref 0–13.4)
NEUTROPHILS # BLD AUTO: 5.29 K/UL (ref 1.82–7.42)
NEUTROPHILS NFR BLD: 64.9 % (ref 44–72)
NRBC # BLD AUTO: 0 K/UL
NRBC BLD-RTO: 0 /100 WBC (ref 0–0.2)
PLATELET # BLD AUTO: 183 K/UL (ref 164–446)
PMV BLD AUTO: 10.6 FL (ref 9–12.9)
RBC # BLD AUTO: 4.55 M/UL (ref 4.2–5.4)
T PALLIDUM AB SER QL IA: NORMAL
WBC # BLD AUTO: 8.2 K/UL (ref 4.8–10.8)

## 2023-09-14 PROCEDURE — 302449 STATCHG TRIAGE ONLY (STATISTIC)

## 2023-09-14 PROCEDURE — 86780 TREPONEMA PALLIDUM: CPT

## 2023-09-14 PROCEDURE — 700105 HCHG RX REV CODE 258: Performed by: OBSTETRICS & GYNECOLOGY

## 2023-09-14 PROCEDURE — 86850 RBC ANTIBODY SCREEN: CPT

## 2023-09-14 PROCEDURE — 87653 STREP B DNA AMP PROBE: CPT

## 2023-09-14 PROCEDURE — 86901 BLOOD TYPING SEROLOGIC RH(D): CPT

## 2023-09-14 PROCEDURE — 89060 EXAM SYNOVIAL FLUID CRYSTALS: CPT

## 2023-09-14 PROCEDURE — 700111 HCHG RX REV CODE 636 W/ 250 OVERRIDE (IP): Performed by: OBSTETRICS & GYNECOLOGY

## 2023-09-14 PROCEDURE — 86900 BLOOD TYPING SEROLOGIC ABO: CPT

## 2023-09-14 PROCEDURE — 85025 COMPLETE CBC W/AUTO DIFF WBC: CPT

## 2023-09-14 PROCEDURE — 76816 OB US FOLLOW-UP PER FETUS: CPT

## 2023-09-14 PROCEDURE — 36415 COLL VENOUS BLD VENIPUNCTURE: CPT

## 2023-09-14 PROCEDURE — 770002 HCHG ROOM/CARE - OB PRIVATE (112)

## 2023-09-14 RX ORDER — BETAMETHASONE SODIUM PHOSPHATE AND BETAMETHASONE ACETATE 3; 3 MG/ML; MG/ML
12 INJECTION, SUSPENSION INTRA-ARTICULAR; INTRALESIONAL; INTRAMUSCULAR; SOFT TISSUE EVERY 24 HOURS
Status: COMPLETED | OUTPATIENT
Start: 2023-09-14 | End: 2023-09-15

## 2023-09-14 RX ORDER — ALUMINA, MAGNESIA, AND SIMETHICONE 2400; 2400; 240 MG/30ML; MG/30ML; MG/30ML
30 SUSPENSION ORAL EVERY 6 HOURS PRN
Status: DISCONTINUED | OUTPATIENT
Start: 2023-09-14 | End: 2023-09-15 | Stop reason: HOSPADM

## 2023-09-14 RX ORDER — SODIUM CHLORIDE, SODIUM LACTATE, POTASSIUM CHLORIDE, CALCIUM CHLORIDE 600; 310; 30; 20 MG/100ML; MG/100ML; MG/100ML; MG/100ML
INJECTION, SOLUTION INTRAVENOUS CONTINUOUS
Status: DISCONTINUED | OUTPATIENT
Start: 2023-09-14 | End: 2023-09-16 | Stop reason: HOSPADM

## 2023-09-14 RX ORDER — ONDANSETRON 2 MG/ML
4 INJECTION INTRAMUSCULAR; INTRAVENOUS EVERY 6 HOURS PRN
Status: DISCONTINUED | OUTPATIENT
Start: 2023-09-14 | End: 2023-09-15 | Stop reason: HOSPADM

## 2023-09-14 RX ORDER — ACETAMINOPHEN 500 MG
1000 TABLET ORAL
Status: COMPLETED | OUTPATIENT
Start: 2023-09-14 | End: 2023-09-15

## 2023-09-14 RX ORDER — BUPIVACAINE HYDROCHLORIDE 2.5 MG/ML
INJECTION, SOLUTION EPIDURAL; INFILTRATION; INTRACAUDAL
Status: ACTIVE
Start: 2023-09-14 | End: 2023-09-14

## 2023-09-14 RX ORDER — ONDANSETRON 4 MG/1
4 TABLET, ORALLY DISINTEGRATING ORAL EVERY 6 HOURS PRN
Status: DISCONTINUED | OUTPATIENT
Start: 2023-09-14 | End: 2023-09-15 | Stop reason: HOSPADM

## 2023-09-14 RX ORDER — TERBUTALINE SULFATE 1 MG/ML
0.25 INJECTION, SOLUTION SUBCUTANEOUS
Status: DISCONTINUED | OUTPATIENT
Start: 2023-09-14 | End: 2023-09-15 | Stop reason: HOSPADM

## 2023-09-14 RX ORDER — ROPIVACAINE HYDROCHLORIDE 2 MG/ML
INJECTION, SOLUTION EPIDURAL; INFILTRATION; PERINEURAL
Status: DISCONTINUED
Start: 2023-09-14 | End: 2023-09-14

## 2023-09-14 RX ORDER — LIDOCAINE HYDROCHLORIDE 10 MG/ML
20 INJECTION, SOLUTION INFILTRATION; PERINEURAL
Status: DISCONTINUED | OUTPATIENT
Start: 2023-09-14 | End: 2023-09-15 | Stop reason: HOSPADM

## 2023-09-14 RX ORDER — OXYTOCIN 10 [USP'U]/ML
10 INJECTION, SOLUTION INTRAMUSCULAR; INTRAVENOUS
Status: DISCONTINUED | OUTPATIENT
Start: 2023-09-14 | End: 2023-09-15 | Stop reason: HOSPADM

## 2023-09-14 RX ORDER — IBUPROFEN 800 MG/1
800 TABLET ORAL
Status: COMPLETED | OUTPATIENT
Start: 2023-09-14 | End: 2023-09-15

## 2023-09-14 RX ORDER — METHYLERGONOVINE MALEATE 0.2 MG/ML
0.2 INJECTION INTRAVENOUS
Status: COMPLETED | OUTPATIENT
Start: 2023-09-14 | End: 2023-09-15

## 2023-09-14 RX ORDER — MISOPROSTOL 200 UG/1
800 TABLET ORAL
Status: COMPLETED | OUTPATIENT
Start: 2023-09-14 | End: 2023-09-15

## 2023-09-14 RX ADMIN — VANCOMYCIN HYDROCHLORIDE 1000 MG: 5 INJECTION, POWDER, LYOPHILIZED, FOR SOLUTION INTRAVENOUS at 08:13

## 2023-09-14 RX ADMIN — VANCOMYCIN HYDROCHLORIDE 1000 MG: 5 INJECTION, POWDER, LYOPHILIZED, FOR SOLUTION INTRAVENOUS at 17:22

## 2023-09-14 RX ADMIN — SODIUM CHLORIDE, POTASSIUM CHLORIDE, SODIUM LACTATE AND CALCIUM CHLORIDE: 600; 310; 30; 20 INJECTION, SOLUTION INTRAVENOUS at 17:21

## 2023-09-14 RX ADMIN — SODIUM CHLORIDE, POTASSIUM CHLORIDE, SODIUM LACTATE AND CALCIUM CHLORIDE: 600; 310; 30; 20 INJECTION, SOLUTION INTRAVENOUS at 23:02

## 2023-09-14 RX ADMIN — BETAMETHASONE SODIUM PHOSPHATE AND BETAMETHASONE ACETATE 12 MG: 3; 3 INJECTION, SUSPENSION INTRA-ARTICULAR; INTRALESIONAL; INTRAMUSCULAR at 06:55

## 2023-09-14 RX ADMIN — SODIUM CHLORIDE, POTASSIUM CHLORIDE, SODIUM LACTATE AND CALCIUM CHLORIDE 1000 ML: 600; 310; 30; 20 INJECTION, SOLUTION INTRAVENOUS at 08:10

## 2023-09-14 ASSESSMENT — PAIN DESCRIPTION - PAIN TYPE
TYPE: ACUTE PAIN

## 2023-09-14 ASSESSMENT — LIFESTYLE VARIABLES
TOTAL SCORE: 0
ALCOHOL_USE: NO
EVER HAD A DRINK FIRST THING IN THE MORNING TO STEADY YOUR NERVES TO GET RID OF A HANGOVER: NO
TOTAL SCORE: 0
HAVE YOU EVER FELT YOU SHOULD CUT DOWN ON YOUR DRINKING: NO
AVERAGE NUMBER OF DAYS PER WEEK YOU HAVE A DRINK CONTAINING ALCOHOL: 0
CONSUMPTION TOTAL: NEGATIVE
TOTAL SCORE: 0
ON A TYPICAL DAY WHEN YOU DRINK ALCOHOL HOW MANY DRINKS DO YOU HAVE: 0
EVER FELT BAD OR GUILTY ABOUT YOUR DRINKING: NO
HOW MANY TIMES IN THE PAST YEAR HAVE YOU HAD 5 OR MORE DRINKS IN A DAY: 0
EVER_SMOKED: NEVER
HAVE PEOPLE ANNOYED YOU BY CRITICIZING YOUR DRINKING: NO

## 2023-09-14 ASSESSMENT — PATIENT HEALTH QUESTIONNAIRE - PHQ9
SUM OF ALL RESPONSES TO PHQ9 QUESTIONS 1 AND 2: 0
1. LITTLE INTEREST OR PLEASURE IN DOING THINGS: NOT AT ALL
2. FEELING DOWN, DEPRESSED, IRRITABLE, OR HOPELESS: NOT AT ALL
1. LITTLE INTEREST OR PLEASURE IN DOING THINGS: NOT AT ALL
SUM OF ALL RESPONSES TO PHQ9 QUESTIONS 1 AND 2: 0
2. FEELING DOWN, DEPRESSED, IRRITABLE, OR HOPELESS: NOT AT ALL

## 2023-09-14 ASSESSMENT — COPD QUESTIONNAIRES
DO YOU EVER COUGH UP ANY MUCUS OR PHLEGM?: NO/ONLY WITH OCCASIONAL COLDS OR INFECTIONS
HAVE YOU SMOKED AT LEAST 100 CIGARETTES IN YOUR ENTIRE LIFE: NO/DON'T KNOW
DURING THE PAST 4 WEEKS HOW MUCH DID YOU FEEL SHORT OF BREATH: NONE/LITTLE OF THE TIME
IN THE PAST 12 MONTHS DO YOU DO LESS THAN YOU USED TO BECAUSE OF YOUR BREATHING PROBLEMS: DISAGREE/UNSURE

## 2023-09-14 ASSESSMENT — FIBROSIS 4 INDEX: FIB4 SCORE: 0.82

## 2023-09-14 NOTE — PROGRESS NOTES
[facility] plan of care discussed pt care assumed. Initial assessment done.  at bedside for support.   1130 Pt requests epidural if possible, MD notified. SVE 3/75/2. Epidural permits signed IV up for hydration.   1215 Dr Triana in to see pt regarding epidural, pt not ready will hold off for a while. 1230 Dr Wheeler in to see pt-Plan of care discussed.  Birth parents in room next door. Plan discussed with them.1357 Fhts down to 60 for 50 seconds. Pt repositioned to left side and FHT returned to BL. Moderate variability noted. UC's spaced out at this time.   1430 [facility]  assumed.

## 2023-09-14 NOTE — H&P
"DATE OF ADMISSION:  2023     OB/GYN ADMISSION HISTORY AND PHYSICAL     IDENTIFICATION:  This is a 28-year-old  3, para 2-0-0-2, with an EDC of   10/21/2023 and EGA of 34 and 5/7th weeks who presents with a chief complaint   of \"I broke my water.\"     HISTORY OF PRESENT ILLNESS:  This is a patient of Dr. Flannery who has   gotten good prenatal care.  Her prenatal care has been uncomplicated.  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  She had low risk NIPT,   normal  ultrasound.  Her beta Strep is unknown.  She does have a   AMOXICILLIN ALLERGY.  She states that at approximately 3:30 this morning, she   had copious fluid that was pink, come out of her vagina and then she began   ramón.  She subsequently came here to labor and delivery where she has   gross pooling.  Fern is pending.  Her cervix is 1-2, 80, -2.  She is   ramón regularly.  Fetal heart tracings reactive, category 1.  She is   comfortable with her contractions.  She has no other complaints.  Denies   nausea, vomiting, fever, chills, change in bowel or bladder habits.  Admits   good fetal movement.  Denies symptoms of PIH.  The birth parents are en route   from [place] currently.     OBSTETRICAL HISTORY:  Significant for 2 previous vaginal deliveries.     GYNECOLOGIC HISTORY:  Denies STDs or abnormal Paps.     ALLERGIES:  AMOXICILLIN.     CURRENT MEDICATIONS:  Prenatal vitamin B6 and Unisom as needed for nausea.     MEDICAL PROBLEMS:  None.     PAST SURGICAL HISTORY:  None.     SOCIAL HISTORY:  Denies alcohol, tobacco or drug abuse.     FAMILY HISTORY:  Noncontributory.     REVIEW OF SYSTEMS:  Times 12 is negative per AMA standards available in chart.     LABORATORY DATA:  She is A positive, rubella immune.  She had normal NIPT.    She had a Glucola of 85.  Beta Strep is pending.     PHYSICAL EXAMINATION:  VITAL SIGNS:  The patient is afebrile.  Vital signs within normal limits.    Fetal heart " tracings reactive, category 1.  She is ramón regularly.  GENERAL:  She is awake, alert, in no apparent distress.  NECK:  Supple.  HEART:  Regular.  CHEST:  Clear.  BREASTS:  Symmetrical.  ABDOMEN:  Soft, gravid, size appropriate for dates.  EXTREMITIES:  Negative.  GYNECOLOGIC:  As above.     PLAN:  At this time:  1.  Admit.  2.  Fetal heart tone and contraction monitoring.  3.  Pain control.  4.  IM betamethasone.  5.  We will swab for rapid beta Strep.  6.  After discussing PENICILLIN ALLERGY, pharmacy does not have clindamycin   available.  Subsequently, we will start the patient on vancomycin, pending   rapid beta Strep results.  7.  We will not tocolyze.  8.  We will consent for and anticipate normal spontaneous vaginal delivery.        ______________________________  MD EVETTE Morejon/ZACHARY/MAYA    DD:  09/14/2023 06:38  DT:  09/14/2023 07:25    Job#:  [id number]

## 2023-09-14 NOTE — CARE PLAN
The patient is Watcher - Medium risk of patient condition declining or worsening         Progress made toward(s) clinical / shift goals:   Pt educated on POC.   Prophylaxis Antibiotics  And need for continuous fetal monitoring.   Patient is not progressing towards the following goals:  Pt participates in POC, and will notify RN immediately for increased pain with contractions.

## 2023-09-14 NOTE — PROGRESS NOTES
"Labor Progress Note    Chelsey Spear   34w5d      Subjective:  Selina frequently but not feeling pain or breathing through them. S/p beta 1 and 1st dose of vanc. Usg done: vtx, anterior placenta, EFW 2242g (18th%ile)    Parents are coming from [place] today but not here yet.     Objective:   Vitals:    09/14/23 0510 09/14/23 0728   BP: 107/73 96/59   Pulse: 85 76   Temp: 36.3 °C (97.3 °F) 37.6 °C (99.7 °F)   TempSrc: Temporal Temporal   SpO2: 91%    Weight: 57.2 kg (126 lb)    Height: 1.575 m (5' 2\")      Gen: no acute distress  Abd: gravid nt/nd  Ext: no calf pain neri LE  FHT : category 1, moderate variability, no decels, contractions q 3-4 min.     Labs:  Recent Results (from the past 24 hour(s))   Ferning if suspected rupture of membranes (ROM)    Collection Time: 09/14/23  5:20 AM   Result Value Ref Range    Fern Test On Amniotic Fluid see below Not present   Hold Blood Bank Specimen (Not Tested)    Collection Time: 09/14/23  6:40 AM   Result Value Ref Range    Holding Tube - Bb DONE    CBC with differential    Collection Time: 09/14/23  6:40 AM   Result Value Ref Range    WBC 8.2 4.8 - 10.8 K/uL    RBC 4.55 4.20 - 5.40 M/uL    Hemoglobin 13.1 12.0 - 16.0 g/dL    Hematocrit 40.4 37.0 - 47.0 %    MCV 88.8 81.4 - 97.8 fL    MCH 28.8 27.0 - 33.0 pg    MCHC 32.4 32.2 - 35.5 g/dL    RDW 44.2 35.9 - 50.0 fL    Platelet Count 183 164 - 446 K/uL    MPV 10.6 9.0 - 12.9 fL    Neutrophils-Polys 64.90 44.00 - 72.00 %    Lymphocytes 24.50 22.00 - 41.00 %    Monocytes 8.20 0.00 - 13.40 %    Eosinophils 1.30 0.00 - 6.90 %    Basophils 0.50 0.00 - 1.80 %    Immature Granulocytes 0.60 0.00 - 0.90 %    Nucleated RBC 0.00 0.00 - 0.20 /100 WBC    Neutrophils (Absolute) 5.29 1.82 - 7.42 K/uL    Lymphs (Absolute) 2.00 1.00 - 4.80 K/uL    Monos (Absolute) 0.67 0.00 - 0.85 K/uL    Eos (Absolute) 0.11 0.00 - 0.51 K/uL    Baso (Absolute) 0.04 0.00 - 0.12 K/uL    Immature Granulocytes (abs) 0.05 0.00 - 0.11 K/uL    NRBC " (Absolute) 0.00 K/uL   T.PALLIDUM AB YESSY (Syphilis)    Collection Time: 23  6:40 AM   Result Value Ref Range    Syphilis, Treponemal Qual Non-Reactive Non-Reactive       Assessment: plan  28-year-old  3, para 2-0-0-2, with an EDC of   10/21/2023 and EGA of 34 and 5/7th weeks     PPROM - s/p betameth 1 and Vanc for GBS unknown. Will plan for expectant management but will not tocolyse for steroid benefit.   Surrogate- intended parents are on their way from [facility]. Can get NICU consult on their arrival  GBS unknown  May have regular diet now  Fetus 18%ile, vtx, anterior placenta, Overall reassuring tracing.   May have epidural in active/labor or increasing pain      Kimberlee Wheeler M.D.             Gen: in acute distress  Head: normal appearing  HEENT: normal conjunctiva, oral mucosa moist  Lung: respiratory distress tachypneic increase wob.   CV: regular rate and rhythm, no murmurs  Abd: soft, non distended, non tender   MSK: no visible deformities  Neuro: No focal deficits  Skin: Warm  Psych: normal affect Gen: in acute distress  Head: normal appearing  HEENT: normal conjunctiva, oral mucosa moist  Lung: respiratory distress tachypneic increase wob.   CV: regular rate and rhythm, no murmurs POCUS diffuse b lines throughout L pleural effusion. no obvious signs R heart strain.   Abd: soft, non distended, non tender   MSK: no visible deformities  Neuro: No focal deficits  Skin: Warm  Psych: normal affect

## 2023-09-14 NOTE — PROGRESS NOTES
0620 - Report received from PINKY Grande. ROSAURA discussed.    0700 - Report given to STEVE Dejesus discussed.

## 2023-09-14 NOTE — DISCHARGE PLANNING
:    Pt is a surrogate for Intended Parents: Guerline James ([phone]) and Fernando Agustin ([phone]).  A copy of the Surrogacy Delivery Plan and Judgement to Establish Parent-Child Relationship has been scanned into the chart.      Plan:  Infant will discharge to Intended Parents once medically cleared.

## 2023-09-14 NOTE — PROGRESS NOTES
"Pharmacy Vancomycin Kinetics Note for 2023     28 y.o. female on Vancomycin day # 1     Vancomycin Indication (Trough based Dosing):  (PROM)    Provider specified end date: 23    Active Antibiotics (From admission, onward)      Ordered     Ordering Provider       Thu Sep 14, 2023  6:56 AM    23 0656  vancomycin (Vancocin) 1,000 mg in  mL IVPB  (vancomycin (VANCOCIN) IV (LD + Maintenance))  EVERY 8 HOURS        Note to Pharmacy: Per P&T vanco per pharmacy Protocol    Bijan Cisneros M.D.       Thu Sep 14, 2023  6:45 AM    23 0645  MD Alert...Vancomycin per Pharmacy  PHARMACY TO DOSE        Question:  Indication(s) for vancomycin?  Answer:  Other (comments)  Comment:  PROM, cillin allergy    Bijan Cisneros M.D.            Dosing Weight: 53.7 kg (118 lb 4.4 oz)      Admission History: Admitted on 2023 for Labor and delivery, indication for care [O75.9]  Pertinent history: Pregnant patient presenting to L&D with PROM. Patient reports amoxicillin allergy and is empirically being started on vanco therapy.    Allergies:     Amoxicillin     Pertinent cultures to date:     Results       Procedure Component Value Units Date/Time    GRP B STREP, BY PCR (DIRECT) [[id number]]     Order Status: No result Specimen: Genital from Vaginal     GRP B STREP, BY PCR ([facility]) [[id number]]     Order Status: No result Specimen: Genital from Vaginal             Labs:     CrCl cannot be calculated (Patient's most recent lab result is older than the maximum 7 days allowed.).  No results for input(s): \"WBC\", \"NEUTSPOLYS\", \"BANDSSTABS\" in the last 72 hours.  No results for input(s): \"BUN\", \"CREATININE\", \"ALBUMIN\" in the last 72 hours.  No intake or output data in the 24 hours ending 23 0700   /73   Pulse 85   Temp 36.3 °C (97.3 °F) (Temporal)   Ht 1.575 m (5' 2\")   Wt 57.2 kg (126 lb)   SpO2 91%  Temp (24hrs), Av.3 °C (97.3 °F), Min:36.3 °C (97.3 °F), Max:36.3 °C (97.3 °F)      List concerns " for Vancomycin clearance:     None    A/P:     -  Vancomycin dose: 1000mg Q8h (20mg/kg Q8h based on adjusted BW)    -  Next vancomycin level(s):    -None ordered at this time. Likely obtain levels after the 4th maintenance dose unless clinical status or renal function changes.     -  Comments: Concerns for accumulation listed above. Pharmacy will continue to follow and adjust therapy as clinically appropriate.    Rajiv Falcon, MariaelenaD

## 2023-09-14 NOTE — PROGRESS NOTES
28 y.o.  EDC 10/21/23 EGA 34.5 here with PPROM on 23 @ 0330. Allergy to Amoxicillin. Pt is a surrogate for Guerline James and Fernando Agustin, legal documents scanned into media. Baby girl. First dose of Betamethasone  given at 0655. EFM & Newsoms in place. GBS pending. Currently receiving Vancomycin prophylaxis.   SVE @ 0510 2 by Christiane TERRELL RN  Category 1 FHT. Pt reports irregular mild contractions.     Dr. Wheeler to bedside. Regular diet ordered.

## 2023-09-15 ENCOUNTER — ANESTHESIA (OUTPATIENT)
Dept: ANESTHESIOLOGY | Facility: MEDICAL CENTER | Age: 28
End: 2023-09-15
Payer: COMMERCIAL

## 2023-09-15 ENCOUNTER — ANESTHESIA EVENT (OUTPATIENT)
Dept: ANESTHESIOLOGY | Facility: MEDICAL CENTER | Age: 28
End: 2023-09-15
Payer: COMMERCIAL

## 2023-09-15 LAB
ABO GROUP BLD: NORMAL
BASOPHILS # BLD AUTO: 0.1 % (ref 0–1.8)
BASOPHILS # BLD AUTO: 0.2 % (ref 0–1.8)
BASOPHILS # BLD: 0.01 K/UL (ref 0–0.12)
BASOPHILS # BLD: 0.03 K/UL (ref 0–0.12)
BLD GP AB SCN SERPL QL: NORMAL
EOSINOPHIL # BLD AUTO: 0 K/UL (ref 0–0.51)
EOSINOPHIL # BLD AUTO: 0 K/UL (ref 0–0.51)
EOSINOPHIL NFR BLD: 0 % (ref 0–6.9)
EOSINOPHIL NFR BLD: 0 % (ref 0–6.9)
ERYTHROCYTE [DISTWIDTH] IN BLOOD BY AUTOMATED COUNT: 43.9 FL (ref 35.9–50)
ERYTHROCYTE [DISTWIDTH] IN BLOOD BY AUTOMATED COUNT: 45.2 FL (ref 35.9–50)
FIBRINOGEN PPP-MCNC: 356 MG/DL (ref 215–460)
HBV SURFACE AG SER QL: ABNORMAL
HCT VFR BLD AUTO: 36.5 % (ref 37–47)
HCT VFR BLD AUTO: 40.2 % (ref 37–47)
HCV AB SER QL: ABNORMAL
HGB BLD-MCNC: 12.2 G/DL (ref 12–16)
HGB BLD-MCNC: 12.7 G/DL (ref 12–16)
HIV 1+2 AB+HIV1 P24 AG SERPL QL IA: NORMAL
IMM GRANULOCYTES # BLD AUTO: 0.09 K/UL (ref 0–0.11)
IMM GRANULOCYTES # BLD AUTO: 0.14 K/UL (ref 0–0.11)
IMM GRANULOCYTES NFR BLD AUTO: 0.5 % (ref 0–0.9)
IMM GRANULOCYTES NFR BLD AUTO: 0.9 % (ref 0–0.9)
LYMPHOCYTES # BLD AUTO: 1.07 K/UL (ref 1–4.8)
LYMPHOCYTES # BLD AUTO: 1.11 K/UL (ref 1–4.8)
LYMPHOCYTES NFR BLD: 6.4 % (ref 22–41)
LYMPHOCYTES NFR BLD: 7 % (ref 22–41)
MCH RBC QN AUTO: 28.2 PG (ref 27–33)
MCH RBC QN AUTO: 29.3 PG (ref 27–33)
MCHC RBC AUTO-ENTMCNC: 31.6 G/DL (ref 32.2–35.5)
MCHC RBC AUTO-ENTMCNC: 33.4 G/DL (ref 32.2–35.5)
MCV RBC AUTO: 87.7 FL (ref 81.4–97.8)
MCV RBC AUTO: 89.3 FL (ref 81.4–97.8)
MONOCYTES # BLD AUTO: 0.91 K/UL (ref 0–0.85)
MONOCYTES # BLD AUTO: 1.48 K/UL (ref 0–0.85)
MONOCYTES NFR BLD AUTO: 5.7 % (ref 0–13.4)
MONOCYTES NFR BLD AUTO: 8.9 % (ref 0–13.4)
NEUTROPHILS # BLD AUTO: 13.68 K/UL (ref 1.82–7.42)
NEUTROPHILS # BLD AUTO: 13.96 K/UL (ref 1.82–7.42)
NEUTROPHILS NFR BLD: 84.1 % (ref 44–72)
NEUTROPHILS NFR BLD: 86.2 % (ref 44–72)
NRBC # BLD AUTO: 0 K/UL
NRBC # BLD AUTO: 0 K/UL
NRBC BLD-RTO: 0 /100 WBC (ref 0–0.2)
NRBC BLD-RTO: 0 /100 WBC (ref 0–0.2)
PLATELET # BLD AUTO: 168 K/UL (ref 164–446)
PLATELET # BLD AUTO: 200 K/UL (ref 164–446)
PMV BLD AUTO: 10.5 FL (ref 9–12.9)
PMV BLD AUTO: 10.6 FL (ref 9–12.9)
RBC # BLD AUTO: 4.16 M/UL (ref 4.2–5.4)
RBC # BLD AUTO: 4.5 M/UL (ref 4.2–5.4)
RH BLD: NORMAL
RUBV AB SER QL: 301 IU/ML
T PALLIDUM AB SER QL IA: ABNORMAL
WBC # BLD AUTO: 15.9 K/UL (ref 4.8–10.8)
WBC # BLD AUTO: 16.6 K/UL (ref 4.8–10.8)

## 2023-09-15 PROCEDURE — 87340 HEPATITIS B SURFACE AG IA: CPT

## 2023-09-15 PROCEDURE — 36415 COLL VENOUS BLD VENIPUNCTURE: CPT

## 2023-09-15 PROCEDURE — 700102 HCHG RX REV CODE 250 W/ 637 OVERRIDE(OP): Performed by: OBSTETRICS & GYNECOLOGY

## 2023-09-15 PROCEDURE — A9270 NON-COVERED ITEM OR SERVICE: HCPCS | Performed by: OBSTETRICS & GYNECOLOGY

## 2023-09-15 PROCEDURE — 700111 HCHG RX REV CODE 636 W/ 250 OVERRIDE (IP): Mod: JZ

## 2023-09-15 PROCEDURE — 700111 HCHG RX REV CODE 636 W/ 250 OVERRIDE (IP): Performed by: ANESTHESIOLOGY

## 2023-09-15 PROCEDURE — 0HQ9XZZ REPAIR PERINEUM SKIN, EXTERNAL APPROACH: ICD-10-PCS | Performed by: OBSTETRICS & GYNECOLOGY

## 2023-09-15 PROCEDURE — 86803 HEPATITIS C AB TEST: CPT

## 2023-09-15 PROCEDURE — 59409 OBSTETRICAL CARE: CPT

## 2023-09-15 PROCEDURE — 85025 COMPLETE CBC W/AUTO DIFF WBC: CPT

## 2023-09-15 PROCEDURE — 700101 HCHG RX REV CODE 250: Performed by: ANESTHESIOLOGY

## 2023-09-15 PROCEDURE — 700111 HCHG RX REV CODE 636 W/ 250 OVERRIDE (IP): Performed by: OBSTETRICS & GYNECOLOGY

## 2023-09-15 PROCEDURE — 700111 HCHG RX REV CODE 636 W/ 250 OVERRIDE (IP): Mod: JZ | Performed by: ANESTHESIOLOGY

## 2023-09-15 PROCEDURE — 700105 HCHG RX REV CODE 258: Performed by: OBSTETRICS & GYNECOLOGY

## 2023-09-15 PROCEDURE — 10D17Z9 MANUAL EXTRACTION OF PRODUCTS OF CONCEPTION, RETAINED, VIA NATURAL OR ARTIFICIAL OPENING: ICD-10-PCS | Performed by: OBSTETRICS & GYNECOLOGY

## 2023-09-15 PROCEDURE — 304965 HCHG RECOVERY SERVICES

## 2023-09-15 PROCEDURE — 86780 TREPONEMA PALLIDUM: CPT

## 2023-09-15 PROCEDURE — 303615 HCHG EPIDURAL/SPINAL ANESTHESIA FOR LABOR

## 2023-09-15 PROCEDURE — 87389 HIV-1 AG W/HIV-1&-2 AB AG IA: CPT

## 2023-09-15 PROCEDURE — 770002 HCHG ROOM/CARE - OB PRIVATE (112)

## 2023-09-15 PROCEDURE — 86592 SYPHILIS TEST NON-TREP QUAL: CPT

## 2023-09-15 PROCEDURE — 85384 FIBRINOGEN ACTIVITY: CPT

## 2023-09-15 PROCEDURE — 86762 RUBELLA ANTIBODY: CPT

## 2023-09-15 RX ORDER — IBUPROFEN 800 MG/1
800 TABLET ORAL EVERY 8 HOURS PRN
Status: DISCONTINUED | OUTPATIENT
Start: 2023-09-15 | End: 2023-09-16 | Stop reason: HOSPADM

## 2023-09-15 RX ORDER — EPHEDRINE SULFATE 50 MG/ML
5 INJECTION, SOLUTION INTRAVENOUS
Status: COMPLETED | OUTPATIENT
Start: 2023-09-15 | End: 2023-09-15

## 2023-09-15 RX ORDER — DOCUSATE SODIUM 100 MG/1
100 CAPSULE, LIQUID FILLED ORAL 2 TIMES DAILY
Status: DISCONTINUED | OUTPATIENT
Start: 2023-09-15 | End: 2023-09-16 | Stop reason: HOSPADM

## 2023-09-15 RX ORDER — BUPIVACAINE HYDROCHLORIDE 2.5 MG/ML
INJECTION, SOLUTION EPIDURAL; INFILTRATION; INTRACAUDAL PRN
Status: DISCONTINUED | OUTPATIENT
Start: 2023-09-15 | End: 2023-09-15 | Stop reason: SURG

## 2023-09-15 RX ORDER — OXYCODONE HYDROCHLORIDE 5 MG/1
5 TABLET ORAL EVERY 4 HOURS PRN
Status: DISCONTINUED | OUTPATIENT
Start: 2023-09-15 | End: 2023-09-16 | Stop reason: HOSPADM

## 2023-09-15 RX ORDER — ACETAMINOPHEN 500 MG
1000 TABLET ORAL EVERY 6 HOURS PRN
Status: DISCONTINUED | OUTPATIENT
Start: 2023-09-15 | End: 2023-09-16 | Stop reason: HOSPADM

## 2023-09-15 RX ORDER — ROPIVACAINE HYDROCHLORIDE 2 MG/ML
INJECTION, SOLUTION EPIDURAL; INFILTRATION; PERINEURAL CONTINUOUS
Status: DISCONTINUED | OUTPATIENT
Start: 2023-09-15 | End: 2023-09-16 | Stop reason: HOSPADM

## 2023-09-15 RX ORDER — TRANEXAMIC ACID 100 MG/ML
INJECTION, SOLUTION INTRAVENOUS
Status: DISCONTINUED
Start: 2023-09-15 | End: 2023-09-15

## 2023-09-15 RX ORDER — ROPIVACAINE HYDROCHLORIDE 2 MG/ML
INJECTION, SOLUTION EPIDURAL; INFILTRATION; PERINEURAL
Status: COMPLETED
Start: 2023-09-15 | End: 2023-09-15

## 2023-09-15 RX ORDER — SODIUM CHLORIDE, SODIUM LACTATE, POTASSIUM CHLORIDE, AND CALCIUM CHLORIDE .6; .31; .03; .02 G/100ML; G/100ML; G/100ML; G/100ML
250 INJECTION, SOLUTION INTRAVENOUS PRN
Status: DISCONTINUED | OUTPATIENT
Start: 2023-09-15 | End: 2023-09-15 | Stop reason: HOSPADM

## 2023-09-15 RX ORDER — VITAMIN A ACETATE, BETA CAROTENE, ASCORBIC ACID, CHOLECALCIFEROL, .ALPHA.-TOCOPHEROL ACETATE, DL-, THIAMINE MONONITRATE, RIBOFLAVIN, NIACINAMIDE, PYRIDOXINE HYDROCHLORIDE, FOLIC ACID, CYANOCOBALAMIN, CALCIUM CARBONATE, FERROUS FUMARATE, ZINC OXIDE, CUPRIC OXIDE 3080; 12; 120; 400; 1; 1.84; 3; 20; 22; 920; 25; 200; 27; 10; 2 [IU]/1; UG/1; MG/1; [IU]/1; MG/1; MG/1; MG/1; MG/1; MG/1; [IU]/1; MG/1; MG/1; MG/1; MG/1; MG/1
1 TABLET, FILM COATED ORAL
Status: DISCONTINUED | OUTPATIENT
Start: 2023-09-16 | End: 2023-09-16 | Stop reason: HOSPADM

## 2023-09-15 RX ORDER — LIDOCAINE HYDROCHLORIDE AND EPINEPHRINE 15; 5 MG/ML; UG/ML
INJECTION, SOLUTION EPIDURAL PRN
Status: DISCONTINUED | OUTPATIENT
Start: 2023-09-15 | End: 2023-09-15 | Stop reason: SURG

## 2023-09-15 RX ORDER — SODIUM CHLORIDE, SODIUM LACTATE, POTASSIUM CHLORIDE, AND CALCIUM CHLORIDE .6; .31; .03; .02 G/100ML; G/100ML; G/100ML; G/100ML
1000 INJECTION, SOLUTION INTRAVENOUS
Status: DISCONTINUED | OUTPATIENT
Start: 2023-09-15 | End: 2023-09-15 | Stop reason: HOSPADM

## 2023-09-15 RX ORDER — SODIUM CHLORIDE, SODIUM LACTATE, POTASSIUM CHLORIDE, CALCIUM CHLORIDE 600; 310; 30; 20 MG/100ML; MG/100ML; MG/100ML; MG/100ML
INJECTION, SOLUTION INTRAVENOUS PRN
Status: DISCONTINUED | OUTPATIENT
Start: 2023-09-15 | End: 2023-09-16 | Stop reason: HOSPADM

## 2023-09-15 RX ORDER — MISOPROSTOL 200 UG/1
800 TABLET ORAL
Status: DISCONTINUED | OUTPATIENT
Start: 2023-09-15 | End: 2023-09-16 | Stop reason: HOSPADM

## 2023-09-15 RX ADMIN — EPHEDRINE SULFATE 5 MG: 50 INJECTION, SOLUTION INTRAVENOUS at 05:22

## 2023-09-15 RX ADMIN — OXYTOCIN 125 ML/HR: 10 INJECTION, SOLUTION INTRAMUSCULAR; INTRAVENOUS at 20:20

## 2023-09-15 RX ADMIN — OXYTOCIN 2 MILLI-UNITS/MIN: 10 INJECTION, SOLUTION INTRAMUSCULAR; INTRAVENOUS at 07:07

## 2023-09-15 RX ADMIN — VANCOMYCIN HYDROCHLORIDE 1000 MG: 5 INJECTION, POWDER, LYOPHILIZED, FOR SOLUTION INTRAVENOUS at 00:01

## 2023-09-15 RX ADMIN — ACETAMINOPHEN 1000 MG: 500 TABLET, FILM COATED ORAL at 22:19

## 2023-09-15 RX ADMIN — OXYTOCIN 2 MILLI-UNITS/MIN: 10 INJECTION, SOLUTION INTRAMUSCULAR; INTRAVENOUS at 16:03

## 2023-09-15 RX ADMIN — LIDOCAINE HYDROCHLORIDE,EPINEPHRINE BITARTRATE 3 ML: 15; .005 INJECTION, SOLUTION EPIDURAL; INFILTRATION; INTRACAUDAL; PERINEURAL at 03:08

## 2023-09-15 RX ADMIN — ACETAMINOPHEN 1000 MG: 500 TABLET, FILM COATED ORAL at 02:33

## 2023-09-15 RX ADMIN — ROPIVACAINE HYDROCHLORIDE: 2 INJECTION, SOLUTION EPIDURAL; INFILTRATION at 03:40

## 2023-09-15 RX ADMIN — METHYLERGONOVINE MALEATE 0.2 MG: 0.2 INJECTION, SOLUTION INTRAMUSCULAR; INTRAVENOUS at 16:01

## 2023-09-15 RX ADMIN — SODIUM CHLORIDE, POTASSIUM CHLORIDE, SODIUM LACTATE AND CALCIUM CHLORIDE: 600; 310; 30; 20 INJECTION, SOLUTION INTRAVENOUS at 14:47

## 2023-09-15 RX ADMIN — FENTANYL CITRATE 100 MCG: 50 INJECTION, SOLUTION INTRAMUSCULAR; INTRAVENOUS at 03:13

## 2023-09-15 RX ADMIN — SODIUM CHLORIDE, POTASSIUM CHLORIDE, SODIUM LACTATE AND CALCIUM CHLORIDE: 600; 310; 30; 20 INJECTION, SOLUTION INTRAVENOUS at 03:09

## 2023-09-15 RX ADMIN — IBUPROFEN 800 MG: 800 TABLET, FILM COATED ORAL at 18:24

## 2023-09-15 RX ADMIN — MISOPROSTOL 800 MCG: 200 TABLET ORAL at 16:02

## 2023-09-15 RX ADMIN — DOCUSATE SODIUM 100 MG: 100 CAPSULE, LIQUID FILLED ORAL at 22:19

## 2023-09-15 RX ADMIN — BUPIVACAINE HYDROCHLORIDE 4 ML: 2.5 INJECTION, SOLUTION EPIDURAL; INFILTRATION; INTRACAUDAL at 03:13

## 2023-09-15 RX ADMIN — BETAMETHASONE SODIUM PHOSPHATE AND BETAMETHASONE ACETATE 12 MG: 3; 3 INJECTION, SUSPENSION INTRA-ARTICULAR; INTRALESIONAL; INTRAMUSCULAR at 06:53

## 2023-09-15 RX ADMIN — ONDANSETRON 4 MG: 2 INJECTION INTRAMUSCULAR; INTRAVENOUS at 16:17

## 2023-09-15 RX ADMIN — EPHEDRINE SULFATE 5 MG: 50 INJECTION, SOLUTION INTRAVENOUS at 05:11

## 2023-09-15 RX ADMIN — OXYTOCIN 125 ML/HR: 10 INJECTION, SOLUTION INTRAMUSCULAR; INTRAVENOUS at 17:01

## 2023-09-15 RX ADMIN — CEFAZOLIN 2 G: 2 INJECTION, POWDER, FOR SOLUTION INTRAMUSCULAR; INTRAVENOUS at 16:25

## 2023-09-15 RX ADMIN — ROPIVACAINE HYDROCHLORIDE: 2 INJECTION, SOLUTION EPIDURAL; INFILTRATION at 14:46

## 2023-09-15 ASSESSMENT — PAIN DESCRIPTION - PAIN TYPE: TYPE: ACUTE PAIN

## 2023-09-15 NOTE — PROGRESS NOTES
1900_ Assumed pt care. Report from April, RN. POC reviewed and understanding verbalized.    0253_ Dr Triana @ bedside for epidural.     0300_ Time out called    0330_ Vargas placed. SVE as noted.    0511_ Ephedrine given x2. Anesthesia notified.    0620_ Dr Wheeler @ bedside    0700_ Report to PINKY Cleary

## 2023-09-15 NOTE — ANESTHESIA PROCEDURE NOTES
Epidural Block    Date/Time: 9/15/2023 3:04 AM    Performed by: Alfie Triana M.D.  Authorized by: Alfie Triana M.D.    Patient Location:  OB  Start Time:  9/15/2023 3:04 AM  End Time:  9/15/2023 3:08 AM  Reason for Block: labor analgesia    patient identified, IV checked, site marked, risks and benefits discussed, surgical consent, monitors and equipment checked and pre-op evaluation    Patient Position:  Sitting  Prep: ChloraPrep, patient draped and sterile technique    Monitoring:  Blood pressure, continuous pulse oximetry and heart rate  Approach:  Midline  Location:  L3-L4  Injection Technique:  CHRIS saline  Skin infiltration:  Lidocaine  Strength:  1%  Dose:  3ml  Needle Type:  Tuohy  Needle Gauge:  17 G  Needle Length:  3.5 in  Loss of resistance::  4  Catheter Size:  19 G  Catheter at Skin Depth:  9  Test Dose Result:  Negative

## 2023-09-15 NOTE — PROGRESS NOTES
1400 Report received from Mary Gunter RN.     Discussed poc of vaginal delivery in 226. Consulted NICU charge, RT, and L&D Manager Ismael. Approved for delivery in 226. Pt transferred to 226.   Pt up to bathroom. Small amount bright red blood in toilet. Increasingly painful Contractions. 1645 SVE no change from Previous exam 3/70/-3.   Report to Dr. Wheeler, order to allow pt to eat. Pt desires to wait on epidural. Dr. Triana notified of pt status.   1900 Report to Janet TERRELL RN.

## 2023-09-15 NOTE — ANESTHESIA PREPROCEDURE EVALUATION
Date: 09/15/23  Procedure: Labor Epidural         Relevant Problems   No relevant active problems       Physical Exam    Airway   Mallampati: II  TM distance: >3 FB  Neck ROM: full       Cardiovascular - normal exam  Rhythm: regular  Rate: normal  (-) murmur     Dental - normal exam           Pulmonary - normal exam  Breath sounds clear to auscultation     Abdominal    Neurological - normal exam                 Anesthesia Plan    ASA 2       Plan - epidural   Neuraxial block will be labor analgesia                  Pertinent diagnostic labs and testing reviewed    Informed Consent:    Anesthetic plan and risks discussed with patient.

## 2023-09-15 NOTE — PROGRESS NOTES
MD L&D progress note     S: pt comfortable with epidural    O: VSS afebrile  FHR - 130s, pos accels, neg decels, mod variability, cat 1 FHR  [place] - irregular  SVE - 5/90/-1 per RN exam around 0830     A: IUP at 34+6wks, PPROM, s/p BMZ x 2, GBS neg     P: Continue labor management, fetal monitoring/toco, IV fluids, pitocin, anticipate vaginal delivery

## 2023-09-15 NOTE — PROGRESS NOTES
0700 - Report received from Janet VANCE, POC discussed, no questions at this time, care assumed.     1445 - Dr. Mark, NICU Charge, and RT called to delivery.    1527 - Spontaneous vaginal delivery of female infant, APGARS 8/9. Per Dr. Rico, pt had velamentous cord insertion and adherent placenta that was manually removed. QBL 770mL. Per orders, 800 mcg cytotec placed rectally, 10mL TXA given IV, methergine given IM, and Ancef started for one dose.    1555 - Bands confirmed x2, Female infant transferred via NICU cart to NICU with parents.     1630 - Report given to Ileana VANCE, care transferred.

## 2023-09-15 NOTE — ANESTHESIA POSTPROCEDURE EVALUATION
Patient: Chelsey Spear    Procedure Summary     Date: 09/15/23 Room / Location:     Anesthesia Start: 0253 Anesthesia Stop: 1527    Procedure: Labor Epidural Diagnosis:     Scheduled Providers:  Responsible Provider: Delfino Gao M.D.    Anesthesia Type: epidural ASA Status: 2          Final Anesthesia Type: epidural  Last vitals  BP   Blood Pressure: 105/58    Temp   36.7 °C (98 °F)    Pulse   82   Resp   16    SpO2   91 %      Anesthesia Post Evaluation    Patient location during evaluation: PACU  Patient participation: complete - patient participated  Level of consciousness: awake and alert    Airway patency: patent  Anesthetic complications: no  Cardiovascular status: hemodynamically stable  Respiratory status: acceptable  Hydration status: euvolemic    PONV: none          No notable events documented.

## 2023-09-15 NOTE — CARE PLAN
The patient is Stable - Low risk of patient condition declining or worsening      Problem: Knowledge Deficit - L&D  Goal: Patient and family/caregivers will demonstrate understanding of plan of care, disease process/condition, diagnostic tests and medications  Outcome: Progressing     Problem: Psychosocial - L&D  Goal: Patient's level of anxiety will decrease  Outcome: Progressing     Problem: Psychosocial - L&D  Goal: Patient will be able to discuss coping skills during hospitalization  Outcome: Progressing     Problem: Psychosocial - L&D  Goal: Patient's ability to re-evaluate and adapt role responsibilities will improve  Outcome: Progressing     Problem: Psychosocial - L&D  Goal: Spiritual and cultural needs incorporated into hospitalization  Outcome: Progressing

## 2023-09-15 NOTE — PROGRESS NOTES
Labor Progress Note    Chelsey Spear   34w5d      Subjective:  Last check she was 3cm and was thinking of getting an epidural earlier today but then the contractions really spaced out.     Objective:   Vitals:    09/14/23 0728 09/14/23 1500 09/14/23 1645 09/14/23 1927   BP: 96/59 101/68 102/62 102/59   Pulse: 76 90 73 81   Resp:   16 16   Temp: 37.6 °C (99.7 °F)  36.7 °C (98 °F) 36.6 °C (97.8 °F)   TempSrc: Temporal  Temporal Temporal   SpO2:       Weight:       Height:         Fetal Heart rate tracing: Category 1, moderate variability, no decels  Zephyr : occasionally  SVE deferred    Labs:  Recent Results (from the past 24 hour(s))   Ferning if suspected rupture of membranes (ROM)    Collection Time: 09/14/23  5:20 AM   Result Value Ref Range    Fern Test On Amniotic Fluid see below Not present   Hold Blood Bank Specimen (Not Tested)    Collection Time: 09/14/23  6:40 AM   Result Value Ref Range    Holding Tube - Bb DONE    CBC with differential    Collection Time: 09/14/23  6:40 AM   Result Value Ref Range    WBC 8.2 4.8 - 10.8 K/uL    RBC 4.55 4.20 - 5.40 M/uL    Hemoglobin 13.1 12.0 - 16.0 g/dL    Hematocrit 40.4 37.0 - 47.0 %    MCV 88.8 81.4 - 97.8 fL    MCH 28.8 27.0 - 33.0 pg    MCHC 32.4 32.2 - 35.5 g/dL    RDW 44.2 35.9 - 50.0 fL    Platelet Count 183 164 - 446 K/uL    MPV 10.6 9.0 - 12.9 fL    Neutrophils-Polys 64.90 44.00 - 72.00 %    Lymphocytes 24.50 22.00 - 41.00 %    Monocytes 8.20 0.00 - 13.40 %    Eosinophils 1.30 0.00 - 6.90 %    Basophils 0.50 0.00 - 1.80 %    Immature Granulocytes 0.60 0.00 - 0.90 %    Nucleated RBC 0.00 0.00 - 0.20 /100 WBC    Neutrophils (Absolute) 5.29 1.82 - 7.42 K/uL    Lymphs (Absolute) 2.00 1.00 - 4.80 K/uL    Monos (Absolute) 0.67 0.00 - 0.85 K/uL    Eos (Absolute) 0.11 0.00 - 0.51 K/uL    Baso (Absolute) 0.04 0.00 - 0.12 K/uL    Immature Granulocytes (abs) 0.05 0.00 - 0.11 K/uL    NRBC (Absolute) 0.00 K/uL   T.PALLIDUM AB YESSY (Syphilis)    Collection Time: 09/14/23   6:40 AM   Result Value Ref Range    Syphilis, Treponemal Qual Non-Reactive Non-Reactive   GRP B STREP, BY PCR (DIRECT)    Collection Time: 09/14/23  9:25 AM    Specimen: Vaginal; Genital   Result Value Ref Range    Strep Gp B DNA PCR Negative Negative   UROGENITAL BETA STREP (GP.B)    Collection Time: 09/14/23  9:25 AM    Specimen: Genital   Result Value Ref Range    Significant Indicator NEG     Source GEN     Site Cervical     Culture Result -        Assessment:   IUP at 34w5d  PPROM -   Rapid GBS negative but still have her on Vanc for pcn allergy.   FEtal tracing reassuring  Beta 1 is in and will get #2 tomorrow am.   Expectant management until steroids are on board  S/p nicu consult today.       Kimberlee Wheeler M.D.

## 2023-09-15 NOTE — ANESTHESIA TIME REPORT
Anesthesia Start and Stop Event Times     Date Time Event    9/15/2023 0253 Ready for Procedure     0253 Anesthesia Start     1527 Anesthesia Stop        Responsible Staff  09/15/23    Name Role Begin End    Alfie Triana M.D. Anesth 0253 0700    Delfino Gao M.D. Anesth 0700 1527        Overtime Reason:  no overtime (within assigned shift)    Comments:

## 2023-09-15 NOTE — DISCHARGE PLANNING
:    Met with Intended Parents: Fernando and Guerline who are currently staying at the [facility].  They are here with their 8 month old son and .  They will be at the [facility] for three days and will then be checking into an Airbnb.  SW provided parents with information to the [employer] and discussed that it is available at any time if needed.  Infant will be going to NICU after delivery.  The certified Judgment to Establish Parent-Child Relationship was overnighted to the hospital and given to Birth Certificates.      Plan:  Continue to follow and assist as needed.

## 2023-09-15 NOTE — PROGRESS NOTES
Labor Progress Note    Chelsey Spear   34w6d      Subjective:  Now comfortable with epidural.     Objective:   Vitals:    09/15/23 0530 09/15/23 0550 09/15/23 0610 09/15/23 0630   BP: 97/57 99/58 98/58 94/62   Pulse: 88 68 67 96   Resp:       Temp:       TempSrc:       SpO2:       Weight:       Height:         SVE per RN 4cm  FHT: category 1, moderate variability, no decelerations  Abd: Non tender fundus  Ext: no calf pain neri LE    Labs:  Recent Results (from the past 24 hour(s))   GRP B STREP, BY PCR (DIRECT)    Collection Time: 23  9:25 AM    Specimen: Vaginal; Genital   Result Value Ref Range    Strep Gp B DNA PCR Negative Negative   UROGENITAL BETA STREP (GP.B)    Collection Time: 23  9:25 AM    Specimen: Genital   Result Value Ref Range    Significant Indicator NEG     Source GEN     Site Cervical     Culture Result -        Assessment: Plan  Surrogate pregnancy  34w6d  PPROM -   GBS negative so will dc vanc  Will get Beta #2 this am and then will augment with pitocin and anticipate   NICU ok with delivery in room 26      Kimberlee Wheeler M.D.

## 2023-09-16 ENCOUNTER — PHARMACY VISIT (OUTPATIENT)
Dept: PHARMACY | Facility: MEDICAL CENTER | Age: 28
End: 2023-09-16
Payer: MEDICARE

## 2023-09-16 VITALS
HEART RATE: 61 BPM | DIASTOLIC BLOOD PRESSURE: 68 MMHG | WEIGHT: 126 LBS | HEIGHT: 62 IN | RESPIRATION RATE: 17 BRPM | OXYGEN SATURATION: 95 % | SYSTOLIC BLOOD PRESSURE: 102 MMHG | BODY MASS INDEX: 23.19 KG/M2 | TEMPERATURE: 98.7 F

## 2023-09-16 LAB
B-HEM STREP GENITAL QL CULT: NORMAL
ERYTHROCYTE [DISTWIDTH] IN BLOOD BY AUTOMATED COUNT: 43.9 FL (ref 35.9–50)
HCT VFR BLD AUTO: 32 % (ref 37–47)
HGB BLD-MCNC: 10.5 G/DL (ref 12–16)
MCH RBC QN AUTO: 29 PG (ref 27–33)
MCHC RBC AUTO-ENTMCNC: 32.8 G/DL (ref 32.2–35.5)
MCV RBC AUTO: 88.4 FL (ref 81.4–97.8)
PLATELET # BLD AUTO: 161 K/UL (ref 164–446)
PMV BLD AUTO: 10.9 FL (ref 9–12.9)
RBC # BLD AUTO: 3.62 M/UL (ref 4.2–5.4)
SIGNIFICANT IND 70042: NORMAL
SITE SITE: NORMAL
SOURCE SOURCE: NORMAL
WBC # BLD AUTO: 15.2 K/UL (ref 4.8–10.8)

## 2023-09-16 PROCEDURE — A9270 NON-COVERED ITEM OR SERVICE: HCPCS | Performed by: OBSTETRICS & GYNECOLOGY

## 2023-09-16 PROCEDURE — 85027 COMPLETE CBC AUTOMATED: CPT

## 2023-09-16 PROCEDURE — 700102 HCHG RX REV CODE 250 W/ 637 OVERRIDE(OP): Performed by: OBSTETRICS & GYNECOLOGY

## 2023-09-16 PROCEDURE — 36415 COLL VENOUS BLD VENIPUNCTURE: CPT

## 2023-09-16 PROCEDURE — RXMED WILLOW AMBULATORY MEDICATION CHARGE: Performed by: OBSTETRICS & GYNECOLOGY

## 2023-09-16 RX ORDER — IBUPROFEN 800 MG/1
800 TABLET ORAL EVERY 8 HOURS PRN
Qty: 30 TABLET | Refills: 0 | Status: SHIPPED | OUTPATIENT
Start: 2023-09-16 | End: 2023-11-30

## 2023-09-16 RX ORDER — PSEUDOEPHEDRINE HCL 30 MG
100 TABLET ORAL 2 TIMES DAILY PRN
Qty: 20 CAPSULE | Refills: 0 | Status: SHIPPED | OUTPATIENT
Start: 2023-09-16 | End: 2023-11-30

## 2023-09-16 RX ADMIN — IBUPROFEN 800 MG: 800 TABLET, FILM COATED ORAL at 07:23

## 2023-09-16 RX ADMIN — PRENATAL WITH FERROUS FUM AND FOLIC ACID 1 TABLET: 3080; 920; 120; 400; 22; 1.84; 3; 20; 10; 1; 12; 200; 27; 25; 2 TABLET ORAL at 07:23

## 2023-09-16 RX ADMIN — DOCUSATE SODIUM 100 MG: 100 CAPSULE, LIQUID FILLED ORAL at 07:23

## 2023-09-16 ASSESSMENT — EDINBURGH POSTNATAL DEPRESSION SCALE (EPDS)
I HAVE BEEN ABLE TO LAUGH AND SEE THE FUNNY SIDE OF THINGS: AS MUCH AS I ALWAYS COULD
THE THOUGHT OF HARMING MYSELF HAS OCCURRED TO ME: NEVER
I HAVE BEEN ANXIOUS OR WORRIED FOR NO GOOD REASON: YES, SOMETIMES
THINGS HAVE BEEN GETTING ON TOP OF ME: NO, MOST OF THE TIME I HAVE COPED QUITE WELL
I HAVE BLAMED MYSELF UNNECESSARILY WHEN THINGS WENT WRONG: NOT VERY OFTEN
I HAVE FELT SCARED OR PANICKY FOR NO GOOD REASON: NO, NOT AT ALL
I HAVE LOOKED FORWARD WITH ENJOYMENT TO THINGS: AS MUCH AS I EVER DID
I HAVE BEEN SO UNHAPPY THAT I HAVE BEEN CRYING: NO, NEVER
I HAVE FELT SAD OR MISERABLE: NO, NOT AT ALL
I HAVE BEEN SO UNHAPPY THAT I HAVE HAD DIFFICULTY SLEEPING: NOT AT ALL

## 2023-09-16 ASSESSMENT — PAIN DESCRIPTION - PAIN TYPE: TYPE: ACUTE PAIN

## 2023-09-16 NOTE — DISCHARGE SUMMARY
"Discharge Summary    Admission Date: 23    Discharge Date: 23    Diagnosis:  IUP at 34+ weeks  PPROM  GBS neg  Gestational carrier  Postpartum hemorrhage  Retained placenta  Velamentous cord insertion    Procedures:   w/ manual placenta removal, epidural anesthesia    Subjective: Pt doing well. Pain controlled. Normal lochia. Eating, voiding and ambulating without difficulty. Baby doing well. Desires discharge home today.    /68   Pulse 61   Temp 37.1 °C (98.7 °F) (Temporal)   Resp 17   Ht 1.575 m (5' 2\")   Wt 57.2 kg (126 lb)   SpO2 95%   Breastfeeding No   BMI 23.05 kg/m²     GEN: NAD, comfortable  GI: soft, NT, ND  Gu: fundus firm, nontender, and below umbilicus  EXT: nontender, no edema    Recent Labs     23  0640 09/15/23  1631 09/15/23  1914 23  0517   WBC 8.2 15.9* 16.6* 15.2*   RBC 4.55 4.50 4.16* 3.62*   HEMOGLOBIN 13.1 12.7 12.2 10.5*   HEMATOCRIT 40.4 40.2 36.5* 32.0*   MCV 88.8 89.3 87.7 88.4   MCH 28.8 28.2 29.3 29.0   RDW 44.2 45.2 43.9 43.9   PLATELETCT 183 200 168 161*   MPV 10.6 10.6 10.5 10.9   NEUTSPOLYS 64.90 86.20* 84.10*  --    LYMPHOCYTES 24.50 7.00* 6.40*  --    MONOCYTES 8.20 5.70 8.90  --    EOSINOPHILS 1.30 0.00 0.00  --    BASOPHILS 0.50 0.20 0.10  --          Hospital Course: The patient presented to L&D with ruptured membranes at 34+ weeks. She was admitted and given betamethasone for fetal lung maturity and antibiotics for GBS unknown status. She received 2 doses of betamethasone and then pitocin was started for labor induction. She progressed to complete dilation under epidural anesthesia. She delivered a healthy baby girl with the parents at bedside. The baby did well and went to the NICU due to prematurity. The patient's delivery was complicated by retained placenta and PPH. See delivery report for full details. The patient's bleeding did resolve and she remained stable postpartum. On PPD#1 the patient was meeting all postpartum milestones and " stable for discharge to home.    Discharge Instructions   1. Diet : general  2. Activity: Pelvic rest for 6 weeks    Current Outpatient Medications   Medication Sig Dispense Refill    docusate sodium 100 MG Cap Take 100 mg by mouth 2 times a day as needed for Constipation. 20 Capsule 0    ibuprofen (MOTRIN) 800 MG Tab Take 1 Tablet by mouth every 8 hours as needed for Mild Pain or Moderate Pain. 30 Tablet 0       Follow up: 6 weeks postpartum with Dr. Fernandez    Complications: none      Lavern Mark M.D.

## 2023-09-16 NOTE — DISCHARGE INSTRUCTIONS

## 2023-09-16 NOTE — L&D DELIVERY NOTE
"Vaginal Delivery Procedure Note:    Chelsey Spear is a 28 y.o. , now Para      Weeks of gestation: 34+6 weeks    Pre-procedure Diagnosis: PPROM, s/p BMZ x 2, GBS neg, Gestational carrier    Post-procedure Diagnosis: Same, postpartum hemorrhage, retained placenta, velamentous cord insertion     of viable female infant named \"Yanet\" over intact perineum at 1527. Vertex, OA. Nuchal cord not present. Anterior and posterior shoulders delivered with ease with gentle downward followed by upward traction and maternal pushing efforts. Nose and mouth suctioned with bulb. Infant placed on maternal abdomen. Delayed cord clamp performed. Cord then clamped and cut by FOB.  APGARs 8 and 8  Birth weight - pending  Fundal massage and gentle cord traction applied. Upon attempted delivery of placenta, the cord avulsed. The placenta required manual removal. It was adherent on the left side of the endometrium but with manual removal  well. There were some placental fragments palpated on uterine exploration which were removed manually. Repeat uterine exploration performed and there were no longer any palpable placental fragments.   After the placenta was removed, the uterus had bright red bleeding. Fundal massage and pitocin were started. 800mcg misoprostol was placed rectal. TXA given IV. Methergine given IM. Her bleeding improved after these measures.   She will be given 2gm Ancef due to uterine exploration and CBC, coags, and COD were ordered.   Placenta examined: 3 Vessel cord. Velamentous cord insertion.  Laceration: 1st degree perineal laceration present. Repaired laceration with 3-0 vicryl suture in usual running fashion.  Excellent hemostasis by end of procedure.   mL  Anesthesia - epidural  Sponge and needle counts correct.  Patient tolerated procedure well. Mother in stable condition. Baby taken to NICU due to prematurity.    "

## 2023-09-16 NOTE — PROGRESS NOTES
Patient arrive to S[address] via wheelchair with L&D RN. Report received from PINKY Larson . Assessment complete. Denies pain at this time. Patient oriented to room, call light, emergency light, and unit routine. Encourage to call for assistance. Visiting policy discussed.

## 2023-09-16 NOTE — CARE PLAN
The patient is Watcher - Medium risk of patient condition declining or worsening    Shift Goals  Clinical Goals: Postpartum checks WNL  Patient Goals: support  Family Goals: support    Progress made toward(s) clinical / shift goals:  fundus firm, bleeding light    Patient is not progressing towards the following goals: n/a      Problem: Psychosocial - L&D  Goal: Patient will be able to discuss coping skills during hospitalization  Outcome: Progressing     Problem: Risk for Injury  Goal: Patient and fetus will be free of preventable injury/complications  Outcome: Progressing

## 2023-09-16 NOTE — PROGRESS NOTES
Report received from Madiha VANCE at 1630. MD at bedside. Bleeding subsided after receiving several interventions. Orders for COD, CBC and coag labs. Pt also to receive Ancef. Update MD as need.    Pt called out requesting something for pain. Motrin given (see mar).     BS report given to Marsha VANCE.

## 2023-09-16 NOTE — DISCHARGE PLANNING
Discharge Planning Assessment Post Partum    Spoke with pt at bedside     Reason for Referral: Hx PPD   Address: [address] [place] [address]   Type of Living Situation: Stable living with family   Mom Diagnosis: Postpartum-- surrogacy  Baby Diagnosis:    Primary Language: English     Name of Baby: n/a   Father of the Baby: surrogacy   Involved in baby’s care? n/a  Contact Information: n/a    Prenatal Care: Routine   Mom's PCP: Rocío Bernstein P.A.-C.   PCP for new baby: n/a    Support System: Pt has good family support   Coping/Bonding between mother & baby: n/a   Source of Feeding: n/a   Supplies for Infant: n/a     Mom's Insurance: [employer]   Baby Covered on Insurance:n/a   Mother Employed/School: Not currently employed   Other children in the home/names & ages: 7y.o. and 5y.o.     Financial Hardship/Income: None    Mom's Mental status: Stable and alert   Services used prior to admit: None     CPS History: None   Psychiatric History: Pt stated hx of PPD with her 5y.o. currently established with a PPD therapist. Plans to schedule an appointment on Monday   Domestic Violence History: None   Drug/ETOH History: None     Resources Provided: None needed   Referrals Made: None needed      Clearance for Discharge: Pt is clear to discharge home

## 2023-09-16 NOTE — CARE PLAN
The patient is Stable - Low risk of patient condition declining or worsening    Shift Goals  Clinical Goals: VSS, lochia WDL, fundus firm  Patient Goals: support  Family Goals: support    Progress made toward(s) clinical / shift goals:  VSS, lochia scant, fundus firm 1 below U    Patient is not progressing towards the following goals:

## 2023-09-16 NOTE — PROGRESS NOTES
1900 Report received from PINKY Tejeda.    Postpartum checks WNL.    2015 Patient transferred to postpartum in stable condition. Report given to PINKY Ocampo.

## 2023-09-16 NOTE — PROGRESS NOTES
0700- report received from NOC RN. POC discussed. Pt reports she would like to d/c home today.     1000- pumping initiated. Current settings 80 (2min)/60, 30%, 15 min. RN will obtain smaller flanges from lactation room but pt reports no discomfort.

## 2023-09-27 DIAGNOSIS — Z13.0 SCREENING FOR DISORDER OF BLOOD AND BLOOD-FORMING ORGANS: ICD-10-CM

## 2023-09-28 ENCOUNTER — HOSPITAL ENCOUNTER (OUTPATIENT)
Facility: MEDICAL CENTER | Age: 28
End: 2023-09-28
Attending: PEDIATRICS
Payer: COMMERCIAL

## 2023-09-28 PROCEDURE — 369999 HCHG MISC LAB CHARGE

## 2023-09-28 PROCEDURE — 86849 IMMUNOLOGY PROCEDURE: CPT

## 2023-09-28 PROCEDURE — 36415 COLL VENOUS BLD VENIPUNCTURE: CPT

## 2023-09-28 PROCEDURE — 86021 WBC ANTIBODY IDENTIFICATION: CPT

## 2023-09-28 PROCEDURE — 81479 UNLISTED MOLECULAR PATHOLOGY: CPT

## 2023-09-28 PROCEDURE — 86808 CYTOTOXIC ANTIBODY SCREENING: CPT

## 2023-10-13 LAB — TEST NAME 95000: NORMAL

## 2023-11-17 ENCOUNTER — APPOINTMENT (OUTPATIENT)
Dept: ADMISSIONS | Facility: MEDICAL CENTER | Age: 28
End: 2023-11-17
Attending: OBSTETRICS & GYNECOLOGY
Payer: COMMERCIAL

## 2023-11-30 ENCOUNTER — PRE-ADMISSION TESTING (OUTPATIENT)
Dept: ADMISSIONS | Facility: MEDICAL CENTER | Age: 28
End: 2023-11-30
Attending: OBSTETRICS & GYNECOLOGY
Payer: COMMERCIAL

## 2023-11-30 DIAGNOSIS — Z01.812 PRE-OPERATIVE LABORATORY EXAMINATION: ICD-10-CM

## 2023-12-07 ENCOUNTER — PRE-ADMISSION TESTING (OUTPATIENT)
Dept: ADMISSIONS | Facility: MEDICAL CENTER | Age: 28
End: 2023-12-07
Attending: OBSTETRICS & GYNECOLOGY
Payer: COMMERCIAL

## 2023-12-07 DIAGNOSIS — Z01.812 PRE-OPERATIVE LABORATORY EXAMINATION: ICD-10-CM

## 2023-12-07 LAB
ABO GROUP BLD: NORMAL
ALBUMIN SERPL BCP-MCNC: 4.6 G/DL (ref 3.2–4.9)
ALBUMIN/GLOB SERPL: 1.4 G/DL
ALP SERPL-CCNC: 82 U/L (ref 30–99)
ALT SERPL-CCNC: 7 U/L (ref 2–50)
ANION GAP SERPL CALC-SCNC: 10 MMOL/L (ref 7–16)
APPEARANCE UR: CLEAR
AST SERPL-CCNC: 9 U/L (ref 12–45)
BACTERIA #/AREA URNS HPF: ABNORMAL /HPF
BASOPHILS # BLD AUTO: 0.5 % (ref 0–1.8)
BASOPHILS # BLD: 0.05 K/UL (ref 0–0.12)
BILIRUB SERPL-MCNC: 0.3 MG/DL (ref 0.1–1.5)
BILIRUB UR QL STRIP.AUTO: NEGATIVE
BLD GP AB SCN SERPL QL: NORMAL
BUN SERPL-MCNC: 13 MG/DL (ref 8–22)
CALCIUM ALBUM COR SERPL-MCNC: 9.1 MG/DL (ref 8.5–10.5)
CALCIUM SERPL-MCNC: 9.6 MG/DL (ref 8.5–10.5)
CHLORIDE SERPL-SCNC: 104 MMOL/L (ref 96–112)
CO2 SERPL-SCNC: 25 MMOL/L (ref 20–33)
COLOR UR: YELLOW
CREAT SERPL-MCNC: 0.6 MG/DL (ref 0.5–1.4)
EOSINOPHIL # BLD AUTO: 0.12 K/UL (ref 0–0.51)
EOSINOPHIL NFR BLD: 1.3 % (ref 0–6.9)
EPI CELLS #/AREA URNS HPF: ABNORMAL /HPF
ERYTHROCYTE [DISTWIDTH] IN BLOOD BY AUTOMATED COUNT: 42.9 FL (ref 35.9–50)
GFR SERPLBLD CREATININE-BSD FMLA CKD-EPI: 124 ML/MIN/1.73 M 2
GLOBULIN SER CALC-MCNC: 3.2 G/DL (ref 1.9–3.5)
GLUCOSE SERPL-MCNC: 105 MG/DL (ref 65–99)
GLUCOSE UR STRIP.AUTO-MCNC: NEGATIVE MG/DL
HCG SERPL QL: NEGATIVE
HCT VFR BLD AUTO: 43.7 % (ref 37–47)
HGB BLD-MCNC: 14.9 G/DL (ref 12–16)
HYALINE CASTS #/AREA URNS LPF: ABNORMAL /LPF
IMM GRANULOCYTES # BLD AUTO: 0.03 K/UL (ref 0–0.11)
IMM GRANULOCYTES NFR BLD AUTO: 0.3 % (ref 0–0.9)
KETONES UR STRIP.AUTO-MCNC: ABNORMAL MG/DL
LEUKOCYTE ESTERASE UR QL STRIP.AUTO: ABNORMAL
LYMPHOCYTES # BLD AUTO: 1.74 K/UL (ref 1–4.8)
LYMPHOCYTES NFR BLD: 18.9 % (ref 22–41)
MCH RBC QN AUTO: 29.2 PG (ref 27–33)
MCHC RBC AUTO-ENTMCNC: 34.1 G/DL (ref 32.2–35.5)
MCV RBC AUTO: 85.5 FL (ref 81.4–97.8)
MICRO URNS: ABNORMAL
MONOCYTES # BLD AUTO: 0.64 K/UL (ref 0–0.85)
MONOCYTES NFR BLD AUTO: 6.9 % (ref 0–13.4)
NEUTROPHILS # BLD AUTO: 6.63 K/UL (ref 1.82–7.42)
NEUTROPHILS NFR BLD: 72.1 % (ref 44–72)
NITRITE UR QL STRIP.AUTO: NEGATIVE
NRBC # BLD AUTO: 0 K/UL
NRBC BLD-RTO: 0 /100 WBC (ref 0–0.2)
PH UR STRIP.AUTO: 6 [PH] (ref 5–8)
PLATELET # BLD AUTO: 370 K/UL (ref 164–446)
PMV BLD AUTO: 9.5 FL (ref 9–12.9)
POTASSIUM SERPL-SCNC: 4.2 MMOL/L (ref 3.6–5.5)
PROT SERPL-MCNC: 7.8 G/DL (ref 6–8.2)
PROT UR QL STRIP: NEGATIVE MG/DL
RBC # BLD AUTO: 5.11 M/UL (ref 4.2–5.4)
RBC # URNS HPF: ABNORMAL /HPF
RBC UR QL AUTO: NEGATIVE
RH BLD: NORMAL
SODIUM SERPL-SCNC: 139 MMOL/L (ref 135–145)
SP GR UR STRIP.AUTO: 1.02
UROBILINOGEN UR STRIP.AUTO-MCNC: 0.2 MG/DL
WBC # BLD AUTO: 9.2 K/UL (ref 4.8–10.8)
WBC #/AREA URNS HPF: ABNORMAL /HPF

## 2023-12-07 PROCEDURE — 84703 CHORIONIC GONADOTROPIN ASSAY: CPT

## 2023-12-07 PROCEDURE — 80053 COMPREHEN METABOLIC PANEL: CPT

## 2023-12-07 PROCEDURE — 36415 COLL VENOUS BLD VENIPUNCTURE: CPT

## 2023-12-07 PROCEDURE — 81001 URINALYSIS AUTO W/SCOPE: CPT

## 2023-12-07 PROCEDURE — 86900 BLOOD TYPING SEROLOGIC ABO: CPT

## 2023-12-07 PROCEDURE — 85025 COMPLETE CBC W/AUTO DIFF WBC: CPT

## 2023-12-07 PROCEDURE — 86850 RBC ANTIBODY SCREEN: CPT

## 2023-12-07 PROCEDURE — 87086 URINE CULTURE/COLONY COUNT: CPT

## 2023-12-07 PROCEDURE — 86901 BLOOD TYPING SEROLOGIC RH(D): CPT

## 2023-12-10 LAB
BACTERIA UR CULT: NORMAL
SIGNIFICANT IND 70042: NORMAL
SITE SITE: NORMAL
SOURCE SOURCE: NORMAL

## 2023-12-15 ENCOUNTER — ANESTHESIA EVENT (OUTPATIENT)
Dept: SURGERY | Facility: MEDICAL CENTER | Age: 28
End: 2023-12-15
Payer: COMMERCIAL

## 2023-12-15 ENCOUNTER — HOSPITAL ENCOUNTER (OUTPATIENT)
Facility: MEDICAL CENTER | Age: 28
End: 2023-12-15
Attending: OBSTETRICS & GYNECOLOGY | Admitting: OBSTETRICS & GYNECOLOGY
Payer: COMMERCIAL

## 2023-12-15 ENCOUNTER — ANESTHESIA (OUTPATIENT)
Dept: SURGERY | Facility: MEDICAL CENTER | Age: 28
End: 2023-12-15
Payer: COMMERCIAL

## 2023-12-15 VITALS
HEIGHT: 62 IN | DIASTOLIC BLOOD PRESSURE: 81 MMHG | SYSTOLIC BLOOD PRESSURE: 120 MMHG | HEART RATE: 74 BPM | OXYGEN SATURATION: 99 % | RESPIRATION RATE: 20 BRPM | TEMPERATURE: 96.9 F | BODY MASS INDEX: 20.77 KG/M2 | WEIGHT: 112.88 LBS

## 2023-12-15 LAB
ABO GROUP BLD: NORMAL
BLD GP AB SCN SERPL QL: NORMAL
HCG UR QL: NEGATIVE
PATHOLOGY CONSULT NOTE: NORMAL
RH BLD: NORMAL

## 2023-12-15 PROCEDURE — 86900 BLOOD TYPING SEROLOGIC ABO: CPT

## 2023-12-15 PROCEDURE — 700105 HCHG RX REV CODE 258: Performed by: OBSTETRICS & GYNECOLOGY

## 2023-12-15 PROCEDURE — 160009 HCHG ANES TIME/MIN: Performed by: OBSTETRICS & GYNECOLOGY

## 2023-12-15 PROCEDURE — 700102 HCHG RX REV CODE 250 W/ 637 OVERRIDE(OP): Performed by: STUDENT IN AN ORGANIZED HEALTH CARE EDUCATION/TRAINING PROGRAM

## 2023-12-15 PROCEDURE — 86901 BLOOD TYPING SEROLOGIC RH(D): CPT

## 2023-12-15 PROCEDURE — 160029 HCHG SURGERY MINUTES - 1ST 30 MINS LEVEL 4: Performed by: OBSTETRICS & GYNECOLOGY

## 2023-12-15 PROCEDURE — 160046 HCHG PACU - 1ST 60 MINS PHASE II: Performed by: OBSTETRICS & GYNECOLOGY

## 2023-12-15 PROCEDURE — 160035 HCHG PACU - 1ST 60 MINS PHASE I: Performed by: OBSTETRICS & GYNECOLOGY

## 2023-12-15 PROCEDURE — 86850 RBC ANTIBODY SCREEN: CPT

## 2023-12-15 PROCEDURE — 88302 TISSUE EXAM BY PATHOLOGIST: CPT

## 2023-12-15 PROCEDURE — 160041 HCHG SURGERY MINUTES - EA ADDL 1 MIN LEVEL 4: Performed by: OBSTETRICS & GYNECOLOGY

## 2023-12-15 PROCEDURE — 160002 HCHG RECOVERY MINUTES (STAT): Performed by: OBSTETRICS & GYNECOLOGY

## 2023-12-15 PROCEDURE — 160048 HCHG OR STATISTICAL LEVEL 1-5: Performed by: OBSTETRICS & GYNECOLOGY

## 2023-12-15 PROCEDURE — 700101 HCHG RX REV CODE 250: Performed by: OBSTETRICS & GYNECOLOGY

## 2023-12-15 PROCEDURE — 160025 RECOVERY II MINUTES (STATS): Performed by: OBSTETRICS & GYNECOLOGY

## 2023-12-15 PROCEDURE — A9270 NON-COVERED ITEM OR SERVICE: HCPCS | Performed by: STUDENT IN AN ORGANIZED HEALTH CARE EDUCATION/TRAINING PROGRAM

## 2023-12-15 PROCEDURE — 700101 HCHG RX REV CODE 250: Performed by: STUDENT IN AN ORGANIZED HEALTH CARE EDUCATION/TRAINING PROGRAM

## 2023-12-15 PROCEDURE — 700111 HCHG RX REV CODE 636 W/ 250 OVERRIDE (IP): Performed by: STUDENT IN AN ORGANIZED HEALTH CARE EDUCATION/TRAINING PROGRAM

## 2023-12-15 PROCEDURE — 81025 URINE PREGNANCY TEST: CPT

## 2023-12-15 RX ORDER — LIDOCAINE HYDROCHLORIDE 20 MG/ML
INJECTION, SOLUTION EPIDURAL; INFILTRATION; INTRACAUDAL; PERINEURAL PRN
Status: DISCONTINUED | OUTPATIENT
Start: 2023-12-15 | End: 2023-12-15 | Stop reason: SURG

## 2023-12-15 RX ORDER — OXYCODONE HCL 5 MG/5 ML
5 SOLUTION, ORAL ORAL
Status: COMPLETED | OUTPATIENT
Start: 2023-12-15 | End: 2023-12-15

## 2023-12-15 RX ORDER — HYDROMORPHONE HYDROCHLORIDE 1 MG/ML
0.1 INJECTION, SOLUTION INTRAMUSCULAR; INTRAVENOUS; SUBCUTANEOUS
Status: DISCONTINUED | OUTPATIENT
Start: 2023-12-15 | End: 2023-12-15 | Stop reason: HOSPADM

## 2023-12-15 RX ORDER — EPHEDRINE SULFATE 50 MG/ML
INJECTION, SOLUTION INTRAVENOUS PRN
Status: DISCONTINUED | OUTPATIENT
Start: 2023-12-15 | End: 2023-12-15 | Stop reason: SURG

## 2023-12-15 RX ORDER — BUPIVACAINE HYDROCHLORIDE 2.5 MG/ML
INJECTION, SOLUTION EPIDURAL; INFILTRATION; INTRACAUDAL
Status: DISCONTINUED
Start: 2023-12-15 | End: 2023-12-15 | Stop reason: HOSPADM

## 2023-12-15 RX ORDER — SODIUM CHLORIDE, SODIUM LACTATE, POTASSIUM CHLORIDE, CALCIUM CHLORIDE 600; 310; 30; 20 MG/100ML; MG/100ML; MG/100ML; MG/100ML
INJECTION, SOLUTION INTRAVENOUS CONTINUOUS
Status: DISCONTINUED | OUTPATIENT
Start: 2023-12-15 | End: 2023-12-15 | Stop reason: HOSPADM

## 2023-12-15 RX ORDER — MIDAZOLAM HYDROCHLORIDE 1 MG/ML
INJECTION INTRAMUSCULAR; INTRAVENOUS PRN
Status: DISCONTINUED | OUTPATIENT
Start: 2023-12-15 | End: 2023-12-15 | Stop reason: SURG

## 2023-12-15 RX ORDER — ROCURONIUM BROMIDE 10 MG/ML
INJECTION, SOLUTION INTRAVENOUS PRN
Status: DISCONTINUED | OUTPATIENT
Start: 2023-12-15 | End: 2023-12-15 | Stop reason: SURG

## 2023-12-15 RX ORDER — EPHEDRINE SULFATE 50 MG/ML
5 INJECTION, SOLUTION INTRAVENOUS
Status: DISCONTINUED | OUTPATIENT
Start: 2023-12-15 | End: 2023-12-15 | Stop reason: HOSPADM

## 2023-12-15 RX ORDER — LABETALOL HYDROCHLORIDE 5 MG/ML
5 INJECTION, SOLUTION INTRAVENOUS
Status: DISCONTINUED | OUTPATIENT
Start: 2023-12-15 | End: 2023-12-15 | Stop reason: HOSPADM

## 2023-12-15 RX ORDER — MEPERIDINE HYDROCHLORIDE 25 MG/ML
12.5 INJECTION INTRAMUSCULAR; INTRAVENOUS; SUBCUTANEOUS
Status: DISCONTINUED | OUTPATIENT
Start: 2023-12-15 | End: 2023-12-15 | Stop reason: HOSPADM

## 2023-12-15 RX ORDER — HYDRALAZINE HYDROCHLORIDE 20 MG/ML
5 INJECTION INTRAMUSCULAR; INTRAVENOUS
Status: DISCONTINUED | OUTPATIENT
Start: 2023-12-15 | End: 2023-12-15 | Stop reason: HOSPADM

## 2023-12-15 RX ORDER — BUPIVACAINE HYDROCHLORIDE AND EPINEPHRINE 2.5; 5 MG/ML; UG/ML
INJECTION, SOLUTION EPIDURAL; INFILTRATION; INTRACAUDAL; PERINEURAL
Status: DISCONTINUED | OUTPATIENT
Start: 2023-12-15 | End: 2023-12-15 | Stop reason: HOSPADM

## 2023-12-15 RX ORDER — EPINEPHRINE 1 MG/ML(1)
AMPUL (ML) INJECTION
Status: DISCONTINUED
Start: 2023-12-15 | End: 2023-12-15 | Stop reason: HOSPADM

## 2023-12-15 RX ORDER — ONDANSETRON 2 MG/ML
4 INJECTION INTRAMUSCULAR; INTRAVENOUS
Status: DISCONTINUED | OUTPATIENT
Start: 2023-12-15 | End: 2023-12-15 | Stop reason: HOSPADM

## 2023-12-15 RX ORDER — HYDROMORPHONE HYDROCHLORIDE 1 MG/ML
0.2 INJECTION, SOLUTION INTRAMUSCULAR; INTRAVENOUS; SUBCUTANEOUS
Status: DISCONTINUED | OUTPATIENT
Start: 2023-12-15 | End: 2023-12-15 | Stop reason: HOSPADM

## 2023-12-15 RX ORDER — OXYCODONE HCL 5 MG/5 ML
10 SOLUTION, ORAL ORAL
Status: COMPLETED | OUTPATIENT
Start: 2023-12-15 | End: 2023-12-15

## 2023-12-15 RX ORDER — HYDROMORPHONE HYDROCHLORIDE 1 MG/ML
0.4 INJECTION, SOLUTION INTRAMUSCULAR; INTRAVENOUS; SUBCUTANEOUS
Status: DISCONTINUED | OUTPATIENT
Start: 2023-12-15 | End: 2023-12-15 | Stop reason: HOSPADM

## 2023-12-15 RX ORDER — DIPHENHYDRAMINE HYDROCHLORIDE 50 MG/ML
12.5 INJECTION INTRAMUSCULAR; INTRAVENOUS
Status: DISCONTINUED | OUTPATIENT
Start: 2023-12-15 | End: 2023-12-15 | Stop reason: HOSPADM

## 2023-12-15 RX ORDER — HALOPERIDOL 5 MG/ML
1 INJECTION INTRAMUSCULAR
Status: DISCONTINUED | OUTPATIENT
Start: 2023-12-15 | End: 2023-12-15 | Stop reason: HOSPADM

## 2023-12-15 RX ADMIN — PROPOFOL 180 MG: 10 INJECTION, EMULSION INTRAVENOUS at 08:35

## 2023-12-15 RX ADMIN — SODIUM CHLORIDE, POTASSIUM CHLORIDE, SODIUM LACTATE AND CALCIUM CHLORIDE: 600; 310; 30; 20 INJECTION, SOLUTION INTRAVENOUS at 08:09

## 2023-12-15 RX ADMIN — ROCURONIUM BROMIDE 50 MG: 50 INJECTION, SOLUTION INTRAVENOUS at 08:36

## 2023-12-15 RX ADMIN — EPHEDRINE SULFATE 5 MG: 50 INJECTION, SOLUTION INTRAVENOUS at 08:35

## 2023-12-15 RX ADMIN — OXYCODONE HYDROCHLORIDE 5 MG: 5 SOLUTION ORAL at 09:47

## 2023-12-15 RX ADMIN — MIDAZOLAM HYDROCHLORIDE 2 MG: 1 INJECTION, SOLUTION INTRAMUSCULAR; INTRAVENOUS at 08:32

## 2023-12-15 RX ADMIN — FENTANYL CITRATE 50 MCG: 50 INJECTION, SOLUTION INTRAMUSCULAR; INTRAVENOUS at 08:45

## 2023-12-15 RX ADMIN — LIDOCAINE HYDROCHLORIDE 50 MG: 20 INJECTION, SOLUTION EPIDURAL; INFILTRATION; INTRACAUDAL at 08:35

## 2023-12-15 RX ADMIN — FENTANYL CITRATE 50 MCG: 50 INJECTION, SOLUTION INTRAMUSCULAR; INTRAVENOUS at 08:39

## 2023-12-15 ASSESSMENT — PAIN DESCRIPTION - PAIN TYPE
TYPE: SURGICAL PAIN

## 2023-12-15 ASSESSMENT — FIBROSIS 4 INDEX: FIB4 SCORE: 0.27

## 2023-12-15 NOTE — ANESTHESIA TIME REPORT
Anesthesia Start and Stop Event Times       Date Time Event    12/15/2023 0829 Ready for Procedure     0832 Anesthesia Start     0935 Anesthesia Stop          Responsible Staff  12/15/23      Name Role Begin End    Warren Gardner D.O. Anesth 0832 0935          Overtime Reason:  no overtime (within assigned shift)    Comments:

## 2023-12-15 NOTE — OP REPORT
DATE OF SERVICE:  12/15/2023     PREOPERATIVE DIAGNOSIS:  Desires permanent sterilization.     POSTOPERATIVE DIAGNOSIS:  Desires permanent sterilization.     PROCEDURE:  Laparoscopic bilateral salpingectomy.     SURGEON:  Lorelei Fernandez MD     ANESTHESIOLOGIST:  Warren Gardner MD     TYPE OF ANESTHESIA:  General.     ESTIMATED BLOOD LOSS:  10 mL.     FINDINGS:  Normal uterus, retroverted.  Normal ovaries and fallopian tubes   bilaterally.  Normal upper abdomen.     COMPLICATIONS:  None.     PROCEDURE NOTE:  After informed consent was obtained, the patient was taken to   the operating room where she was given general anesthesia and intubated.  She   was placed in lithotomy position with well-padded Paul stirrups.  She was   sterilely prepped and draped.  Vargas catheter was placed into her bladder.    Speculum was placed into the vagina.  Cervix was visualized and grasped with   single tooth tenaculum on the anterior lip.  [place] uterine manipulator was   placed into the cervical os and the speculum was removed.  Attention was   turned to her upper abdomen where 0.25% Marcaine was injected into the   infraumbilical fold.  A 1 cm incision was made.  Abdomen was tented.  Veress   needle was introduced and saline test was performed and was clear.  Abdomen   was insufflated.  Veress needle was removed and a 10/12 trocar was placed   through the umbilical incision. Entrance into the abdominal cavity was   confirmed.  Site of entry was examined and there was no evidence of injury.    Survey of her upper abdomen was performed, appeared normal liver edge,   gallbladder, diaphragm and stomach.  The patient was placed in Trendelenburg   and we could see the pelvis.  Uterus, fallopian tubes and ovaries were all   normal.  A second suprapubic port was placed under direct visualization with a   5 mm trocar.  Using the Prestige grasper, the right fallopian tube was   grasped at its fimbriated end and the tubo-ovarian ligament was  come across in   a serial fashion removing the fallopian tube with the LigaSure device.  The   specimen was pulled through the 5 mm port and handed off.  This specimen will   be inked, but the specimens will be sent together.  Same procedure was carried   out on the left hand side.  It was also handed off through the 5 mm port.    Pedicles were dry.  A 5 mm trocar was removed under direct visualization.    There was no bleeding.  The midline port was then removed and pneumoperitoneum   was released.  Fascial incision was closed in an interrupted fashion using 0   Vicryl.  Skin was closed in subcuticular fashion using 4-0 Monocryl.    Tenaculum and [place] were removed.  Speculum was placed back into the vagina.    Cervix was revisualized.  There was bleeding from the os due to her menstrual   cycle.  There was no bleeding from the tenaculum sites.  The patient was then   placed back in supine position.  She was awoken and taken to recovery room in   stable condition.  There were no complications.        ______________________________  MD BRANDYN Cortez/MARYLOU    DD:  12/15/2023 09:23  DT:  12/15/2023 09:38    Job#:  [id number]

## 2023-12-15 NOTE — DISCHARGE INSTRUCTIONS
HOME CARE INSTRUCTIONS    ACTIVITY: Rest and take it easy for the first 24 hours.  A responsible adult is recommended to remain with you during that time.  It is normal to feel sleepy.  We encourage you to not do anything that requires balance, judgment or coordination.    FOR 24 HOURS DO NOT:  Drive, operate machinery or run household appliances.  Drink beer or alcoholic beverages.  Make important decisions or sign legal documents.    SPECIAL INSTRUCTIONS: see handout     DIET: To avoid nausea, slowly advance diet as tolerated, avoiding spicy or greasy foods for the first day.  Add more substantial food to your diet according to your physician's instructions.  Babies can be fed formula or breast milk as soon as they are hungry.  INCREASE FLUIDS AND FIBER TO AVOID CONSTIPATION.      MEDICATIONS: Resume taking daily medication.  Take prescribed pain medication with food.  If no medication is prescribed, you may take non-aspirin pain medication if needed.  PAIN MEDICATION CAN BE VERY CONSTIPATING.  Take a stool softener or laxative such as senokot, pericolace, or milk of magnesia if needed.    Last pain medication given:  Oxycodone given at 9:47am    A follow-up appointment should be arranged with your doctor; call to schedule.    You should CALL YOUR PHYSICIAN if you develop:  Fever greater than 101 degrees F.  Pain not relieved by medication, or persistent nausea or vomiting.  Excessive bleeding (blood soaking through dressing) or unexpected drainage from the wound.  Extreme redness or swelling around the incision site, drainage of pus or foul smelling drainage.  Inability to urinate or empty your bladder within 8 hours.  Problems with breathing or chest pain.    You should call 911 if you develop problems with breathing or chest pain.  If you are unable to contact your doctor or surgical center, you should go to the nearest emergency room or urgent care center.  Physician's telephone #: Dr. Fernandez  [phone]    MILD FLU-LIKE SYMPTOMS ARE NORMAL.  YOU MAY EXPERIENCE GENERALIZED MUSCLE ACHES, THROAT IRRITATION, HEADACHE AND/OR SOME NAUSEA.    If any questions arise, call your doctor.  If your doctor is not available, please feel free to call the Surgical Center at [phone].  The Center is open Monday through Friday from 7AM to 7PM.      A registered nurse may call you a few days after your surgery to see how you are doing after your procedure.    You may also receive a survey in the mail within the next two weeks and we ask that you take a few moments to complete the survey and return it to us.  Our goal is to provide you with very good care and we value your comments.     Depression / Suicide Risk    As you are discharged from this [facility] facility, it is important to learn how to keep safe from harming yourself.    Recognize the warning signs:  Abrupt changes in personality, positive or negative- including increase in energy   Giving away possessions  Change in eating patterns- significant weight changes-  positive or negative  Change in sleeping patterns- unable to sleep or sleeping all the time   Unwillingness or inability to communicate  Depression  Unusual sadness, discouragement and loneliness  Talk of wanting to die  Neglect of personal appearance   Rebelliousness- reckless behavior  Withdrawal from people/activities they love  Confusion- inability to concentrate     If you or a loved one observes any of these behaviors or has concerns about self-harm, here's what you can do:  Talk about it- your feelings and reasons for harming yourself  Remove any means that you might use to hurt yourself (examples: pills, rope, extension cords, firearm)  Get professional help from the community (Mental Health, Substance Abuse, psychological counseling)  Do not be alone:Call your Safe Contact- someone whom you trust who will be there for you.  Call your local CRISIS HOTLINE 175-0986 or 648-485-9339  Call  your local Children's Mobile Crisis Response Team [place] (029) 841-3671 or www.[employer].com.Huaat  Call the toll free National Suicide Prevention Hotlines   National Suicide Prevention Lifeline 180-018-BXGR (0332)  [place] [employer] [facility] 800-SUICIDE (839-6369)    I acknowledge receipt and understanding of these Home Care instructions.

## 2023-12-15 NOTE — ANESTHESIA POSTPROCEDURE EVALUATION
Patient: Chelsey Spear    Procedure Summary       Date: 12/15/23 Room / Location: [facility] ROOM 25 / SURGERY SAME DAY [facility]    Anesthesia Start: 0832 Anesthesia Stop: 0935    Procedure: LAPAROSCOPIC BILATERAL SALPINGECTOMY (Pelvis) Diagnosis: (DESIRES STERILIZATION)    Surgeons: Lorelei Fernandez M.D. Responsible Provider: Warren Gardner D.O.    Anesthesia Type: general ASA Status: 2            Final Anesthesia Type: general  Last vitals  BP   Blood Pressure: 120/81    Temp   36.1 °C (96.9 °F)    Pulse   74   Resp   20    SpO2   99 %      Anesthesia Post Evaluation    Patient location during evaluation: PACU  Patient participation: complete - patient participated  Level of consciousness: awake and alert    Airway patency: patent  Anesthetic complications: no  Cardiovascular status: hemodynamically stable  Respiratory status: acceptable  Hydration status: euvolemic    PONV: none          No notable events documented.     Nurse Pain Score: 4 (NPRS)

## 2023-12-15 NOTE — OR SURGEON
Immediate Post OP Note    PreOp Diagnosis: desires permanent sterilization      PostOp Diagnosis: same      Procedure(s):  LAPAROSCOPIC BILATERAL SALPINGECTOMY - Wound Class: Clean Contaminated    Surgeon(s):  Lorelei Fernandez M.D.    Anesthesiologist/Type of Anesthesia:  Anesthesiologist: Warren Gardner D.O./General    Surgical Staff:  Circulator: Marlene Dias R.N.  Relief Circulator: Daisy Oviedo R.N.  Scrub Person: Jovanni Dallas    Specimens removed if any:  ID Type Source Tests Collected by Time Destination   A : bilateral fallopian tubes, ink marks right tube Tissue Fallopian Tube PATHOLOGY SPECIMEN Lorelei Fernandez M.D. 12/15/2023  8:59 AM        Estimated Blood Loss: 10ml    Findings: normal uterus, retroverted, normal ovarie and FT, normal upper abdomen    Complications: none, dict#[id number]        12/15/2023 9:18 AM Lorelei Fernandez M.D.

## 2023-12-15 NOTE — ANESTHESIA PREPROCEDURE EVALUATION
Case: [id number] Date/Time: 12/15/23 0845    Procedure: LAPAROSCOPIC BILATERAL SALPINGECTOMY    Pre-op diagnosis: DESIRES STERILIZATION    Location: [facility] ROOM 25 / SURGERY SAME DAY [facility]    Surgeons: Lorelei Fernandez M.D.            Relevant Problems   No relevant active problems       Physical Exam    Airway   Mallampati: II  TM distance: >3 FB  Neck ROM: full       Cardiovascular - normal exam  Rhythm: regular  Rate: normal  (-) murmur     Dental - normal exam           Pulmonary - normal exam  Breath sounds clear to auscultation     Abdominal    Neurological - normal exam                   Anesthesia Plan    ASA 2       Plan - general       Airway plan will be ETT    (Hx Cannabis usage, dry cough this am)      Induction: intravenous    Postoperative Plan: Postoperative administration of opioids is intended.    Pertinent diagnostic labs and testing reviewed    Informed Consent:    Anesthetic plan and risks discussed with patient.    Use of blood products discussed with: patient whom consented to blood products.

## 2023-12-15 NOTE — ANESTHESIA PROCEDURE NOTES
Airway    Date/Time: 12/15/2023 8:37 AM    Performed by: Warren Gardner D.O.  Authorized by: Warren Gardner D.O.    Location:  OR  Urgency:  Elective  Difficult Airway: No    Indications for Airway Management:  Anesthesia      Spontaneous Ventilation: absent    Sedation Level:  Deep  Preoxygenated: Yes    Patient Position:  Sniffing  Final Airway Type:  Endotracheal airway  Final Endotracheal Airway:  ETT  Cuffed: Yes    Technique Used for Successful ETT Placement:  Direct laryngoscopy    Insertion Site:  Oral  Blade Type:  [place]  Laryngoscope Blade/Videolaryngoscope Blade Size:  3  ETT Size (mm):  6.5  Measured from:  Teeth  ETT to Teeth (cm):  20  Placement Verified by: auscultation and capnometry    Cormack-Lehane Classification:  Grade I - full view of glottis  Number of Attempts at Approach:  1

## 2023-12-15 NOTE — OR NURSING
0922 - Pt to PACU 3 from OR.  Bedside report from anesthesiologist and RN.  Attached to monitoring, VSS, breathing is calm and unlabored on 6L oxymask with oral airway in place. X2 incisions to abdomen, dressed with gauze and tegaderm CDI, sammi pad in place.     0940- Oral airway dc'd    0947- Given oxycodone for 4/10 pain per MAR. Called and updated pt's , Cj.     1000- Pt's  brought back to bedside. Reviewed discharge instructions with pt and pt's . All instructions acknowledged, all questions answered.     1029- Pt's  assisting her to get dressed.     1037- IV dc'd, pt escorted off the unit in wheelchair by CCT,  carrying belongings.

## 2024-02-16 ENCOUNTER — APPOINTMENT (OUTPATIENT)
Dept: MEDICAL GROUP | Age: 29
End: 2024-02-16
Payer: COMMERCIAL

## 2024-02-23 ENCOUNTER — OFFICE VISIT (OUTPATIENT)
Dept: MEDICAL GROUP | Age: 29
End: 2024-02-23
Payer: COMMERCIAL

## 2024-02-23 ENCOUNTER — HOSPITAL ENCOUNTER (OUTPATIENT)
Facility: MEDICAL CENTER | Age: 29
End: 2024-02-23
Attending: PHYSICIAN ASSISTANT
Payer: COMMERCIAL

## 2024-02-23 VITALS
SYSTOLIC BLOOD PRESSURE: 114 MMHG | HEART RATE: 78 BPM | BODY MASS INDEX: 21.35 KG/M2 | WEIGHT: 116 LBS | RESPIRATION RATE: 16 BRPM | OXYGEN SATURATION: 98 % | DIASTOLIC BLOOD PRESSURE: 64 MMHG | TEMPERATURE: 97 F | HEIGHT: 62 IN

## 2024-02-23 DIAGNOSIS — Z00.00 WELL ADULT EXAM: ICD-10-CM

## 2024-02-23 DIAGNOSIS — Z02.1 DRUG SCREENING, PRE-EMPLOYMENT: ICD-10-CM

## 2024-02-23 DIAGNOSIS — Z01.84 IMMUNITY STATUS TESTING: ICD-10-CM

## 2024-02-23 DIAGNOSIS — Z11.1 SCREENING-PULMONARY TB: ICD-10-CM

## 2024-02-23 PROCEDURE — G0481 DRUG TEST DEF 8-14 CLASSES: HCPCS

## 2024-02-23 PROCEDURE — 3078F DIAST BP <80 MM HG: CPT | Performed by: PHYSICIAN ASSISTANT

## 2024-02-23 PROCEDURE — 3074F SYST BP LT 130 MM HG: CPT | Performed by: PHYSICIAN ASSISTANT

## 2024-02-23 PROCEDURE — 99395 PREV VISIT EST AGE 18-39: CPT | Performed by: PHYSICIAN ASSISTANT

## 2024-02-23 PROCEDURE — 99213 OFFICE O/P EST LOW 20 MIN: CPT | Mod: 25 | Performed by: PHYSICIAN ASSISTANT

## 2024-02-23 SDOH — ECONOMIC STABILITY: FOOD INSECURITY: WITHIN THE PAST 12 MONTHS, THE FOOD YOU BOUGHT JUST DIDN'T LAST AND YOU DIDN'T HAVE MONEY TO GET MORE.: NEVER TRUE

## 2024-02-23 SDOH — ECONOMIC STABILITY: FOOD INSECURITY: WITHIN THE PAST 12 MONTHS, YOU WORRIED THAT YOUR FOOD WOULD RUN OUT BEFORE YOU GOT MONEY TO BUY MORE.: NEVER TRUE

## 2024-02-23 SDOH — ECONOMIC STABILITY: INCOME INSECURITY: IN THE LAST 12 MONTHS, WAS THERE A TIME WHEN YOU WERE NOT ABLE TO PAY THE MORTGAGE OR RENT ON TIME?: NO

## 2024-02-23 SDOH — HEALTH STABILITY: PHYSICAL HEALTH: ON AVERAGE, HOW MANY MINUTES DO YOU ENGAGE IN EXERCISE AT THIS LEVEL?: 60 MIN

## 2024-02-23 SDOH — ECONOMIC STABILITY: HOUSING INSECURITY
IN THE LAST 12 MONTHS, WAS THERE A TIME WHEN YOU DID NOT HAVE A STEADY PLACE TO SLEEP OR SLEPT IN A SHELTER (INCLUDING NOW)?: NO

## 2024-02-23 SDOH — ECONOMIC STABILITY: TRANSPORTATION INSECURITY
IN THE PAST 12 MONTHS, HAS THE LACK OF TRANSPORTATION KEPT YOU FROM MEDICAL APPOINTMENTS OR FROM GETTING MEDICATIONS?: NO

## 2024-02-23 SDOH — HEALTH STABILITY: PHYSICAL HEALTH: ON AVERAGE, HOW MANY DAYS PER WEEK DO YOU ENGAGE IN MODERATE TO STRENUOUS EXERCISE (LIKE A BRISK WALK)?: 3 DAYS

## 2024-02-23 SDOH — ECONOMIC STABILITY: TRANSPORTATION INSECURITY
IN THE PAST 12 MONTHS, HAS LACK OF RELIABLE TRANSPORTATION KEPT YOU FROM MEDICAL APPOINTMENTS, MEETINGS, WORK OR FROM GETTING THINGS NEEDED FOR DAILY LIVING?: NO

## 2024-02-23 SDOH — ECONOMIC STABILITY: HOUSING INSECURITY: IN THE LAST 12 MONTHS, HOW MANY PLACES HAVE YOU LIVED?: 1

## 2024-02-23 SDOH — ECONOMIC STABILITY: INCOME INSECURITY: HOW HARD IS IT FOR YOU TO PAY FOR THE VERY BASICS LIKE FOOD, HOUSING, MEDICAL CARE, AND HEATING?: NOT VERY HARD

## 2024-02-23 SDOH — ECONOMIC STABILITY: TRANSPORTATION INSECURITY
IN THE PAST 12 MONTHS, HAS LACK OF TRANSPORTATION KEPT YOU FROM MEETINGS, WORK, OR FROM GETTING THINGS NEEDED FOR DAILY LIVING?: NO

## 2024-02-23 SDOH — HEALTH STABILITY: MENTAL HEALTH
STRESS IS WHEN SOMEONE FEELS TENSE, NERVOUS, ANXIOUS, OR CAN'T SLEEP AT NIGHT BECAUSE THEIR MIND IS TROUBLED. HOW STRESSED ARE YOU?: NOT AT ALL

## 2024-02-23 ASSESSMENT — SOCIAL DETERMINANTS OF HEALTH (SDOH)
IN A TYPICAL WEEK, HOW MANY TIMES DO YOU TALK ON THE PHONE WITH FAMILY, FRIENDS, OR NEIGHBORS?: MORE THAN THREE TIMES A WEEK
HOW OFTEN DO YOU GET TOGETHER WITH FRIENDS OR RELATIVES?: ONCE A WEEK
HOW OFTEN DO YOU HAVE A DRINK CONTAINING ALCOHOL: 2-4 TIMES A MONTH
HOW OFTEN DO YOU ATTEND CHURCH OR RELIGIOUS SERVICES?: NEVER
HOW HARD IS IT FOR YOU TO PAY FOR THE VERY BASICS LIKE FOOD, HOUSING, MEDICAL CARE, AND HEATING?: NOT VERY HARD
WITHIN THE PAST 12 MONTHS, YOU WORRIED THAT YOUR FOOD WOULD RUN OUT BEFORE YOU GOT THE MONEY TO BUY MORE: NEVER TRUE
HOW OFTEN DO YOU GET TOGETHER WITH FRIENDS OR RELATIVES?: ONCE A WEEK
DO YOU BELONG TO ANY CLUBS OR ORGANIZATIONS SUCH AS CHURCH GROUPS UNIONS, FRATERNAL OR ATHLETIC GROUPS, OR SCHOOL GROUPS?: NO
HOW OFTEN DO YOU ATTENT MEETINGS OF THE CLUB OR ORGANIZATION YOU BELONG TO?: NEVER
HOW OFTEN DO YOU ATTENT MEETINGS OF THE CLUB OR ORGANIZATION YOU BELONG TO?: NEVER
IN A TYPICAL WEEK, HOW MANY TIMES DO YOU TALK ON THE PHONE WITH FAMILY, FRIENDS, OR NEIGHBORS?: MORE THAN THREE TIMES A WEEK
HOW OFTEN DO YOU HAVE SIX OR MORE DRINKS ON ONE OCCASION: NEVER
HOW MANY DRINKS CONTAINING ALCOHOL DO YOU HAVE ON A TYPICAL DAY WHEN YOU ARE DRINKING: 1 OR 2
HOW OFTEN DO YOU ATTEND CHURCH OR RELIGIOUS SERVICES?: NEVER
DO YOU BELONG TO ANY CLUBS OR ORGANIZATIONS SUCH AS CHURCH GROUPS UNIONS, FRATERNAL OR ATHLETIC GROUPS, OR SCHOOL GROUPS?: NO

## 2024-02-23 ASSESSMENT — LIFESTYLE VARIABLES
SKIP TO QUESTIONS 9-10: 1
HOW OFTEN DO YOU HAVE A DRINK CONTAINING ALCOHOL: 2-4 TIMES A MONTH
AUDIT-C TOTAL SCORE: 2
HOW MANY STANDARD DRINKS CONTAINING ALCOHOL DO YOU HAVE ON A TYPICAL DAY: 1 OR 2
HOW OFTEN DO YOU HAVE SIX OR MORE DRINKS ON ONE OCCASION: NEVER

## 2024-02-23 ASSESSMENT — FIBROSIS 4 INDEX: FIB4 SCORE: 0.28

## 2024-02-23 ASSESSMENT — PATIENT HEALTH QUESTIONNAIRE - PHQ9: CLINICAL INTERPRETATION OF PHQ2 SCORE: 0

## 2024-02-23 NOTE — PROGRESS NOTES
Subjective:     CC:   Chief Complaint   Patient presents with    Annual Exam       HPI:   Chelsey Spear is a 29 y.o. female who presents for annual exam.    She is also going to be starting school.  Is requesting titers to be completed also urine drug screen.    Patient has GYN provider: Yes  Last Pap Smear: 10/2023   H/O Abnormal Pap: No  Last Tdap: 2023  Received HPV series: Yes    Exercise: strenuous regular exercise, aerobic > 3 hours a week  Diet: Well-rounded    Patient's last menstrual period was 2024.  Hx STDs: No  Birth control: tubal  Menses every month with 5-6 days with light, heavy bleeding.  Reports mild cramping and does take OTC analgesics for cramps.  No significant bloating/fluid retention, pelvic pain, or dyspareunia. No abnormal vaginal discharge.  No breast tenderness, mass, nipple discharge, changes in size or contour, or abnormal cyclic discomfort.    OB History    Para Term  AB Living   3 2 1 1 0 3   SAB IAB Ectopic Molar Multiple Live Births   0 0 0 0 0 2      She  reports being sexually active and has had partner(s) who are male. She reports using the following method of birth control/protection: Female Sterilization.    She  has a past medical history of Anxiety, Neoplasm of uncertain behavior (2020), and Ovarian cyst.    She has no past medical history of Addisons disease ([facility]), Adrenal disorder ([facility]), Allergy, Anemia, Blood transfusion without reported diagnosis, Clotting disorder ([facility]), Cushings syndrome ([facility]), Diabetic neuropathy ([facility]), GERD (gastroesophageal reflux disease), Hyperlipidemia, IBD (inflammatory bowel disease), Meningitis, Migraine, Muscle disorder, Osteoporosis, Parathyroid disorder ([facility]), Pituitary disease ([facility]), Sickle cell disease ([facility]), or Substance abuse ([facility]).  She  has a past surgical history that includes pr lap,diagnostic abdomen (N/A, 12/15/2023) and tubal coagulation laparoscopic bilateral.    Family History   Problem  Relation Age of Onset    No Known Problems Mother     Drug abuse Father         No longer communicate to this perso    No Known Problems Sister     Arthritis Maternal Grandmother     Arthritis Maternal Grandfather     No Known Problems Son     No Known Problems Daughter      Social History     Tobacco Use    Smoking status: Former     Current packs/day: 0.00     Types: Cigarettes     Start date: 2015     Quit date: 2019     Years since quittin.6    Smokeless tobacco: Never    Tobacco comments:     I would smoke while out bar hoping in my early 20s, so it was on rare occasion   Vaping Use    Vaping Use: Never used   Substance Use Topics    Alcohol use: Yes     Alcohol/week: 0.6 oz     Types: 1 Cans of beer per week     Comment: not since pregnancy    Drug use: Not Currently     Types: Marijuana     Comment: Last smoked 10/2023       There are no problems to display for this patient.    Current Outpatient Medications   Medication Sig Dispense Refill    multivitamin Tab Take 1 Tablet by mouth every day.       No current facility-administered medications for this visit.     Allergies   Allergen Reactions    Amoxicillin Hives       Review of Systems   Constitutional: Negative for fever, chills and malaise/fatigue.   HENT: Negative for congestion.    Eyes: Negative for pain.   Respiratory: Negative for cough and shortness of breath.    Cardiovascular: Negative for chest pain and leg swelling.   Gastrointestinal: Negative for nausea, vomiting, abdominal pain and diarrhea.   Genitourinary: Negative for dysuria and hematuria.   Skin: Negative for rash.   Neurological: Negative for dizziness, focal weakness and headaches.   Endo/Heme/Allergies: Does not bruise/bleed easily.   Psychiatric/Behavioral: Negative for depression.  The patient is not nervous/anxious.      Objective:   /64 (BP Location: Right arm, Patient Position: Sitting, BP Cuff Size: Adult)   Pulse 78   Temp 36.1 °C (97 °F) (Temporal)    "Resp 16   Ht 1.575 m (5' 2\")   Wt 52.6 kg (116 lb)   LMP 02/11/2024   SpO2 98%   BMI 21.22 kg/m²     Wt Readings from Last 4 Encounters:   02/23/24 52.6 kg (116 lb)   12/15/23 51.2 kg (112 lb 14 oz)   09/14/23 57.2 kg (126 lb)   04/25/23 46.7 kg (102 lb 15.3 oz)       Physical Exam:  Constitutional: Well-developed and well-nourished. Not diaphoretic. No distress.   Skin: Skin is warm and dry. No rash noted.  Head: Atraumatic without lesions.  Eyes: Conjunctivae and extraocular motions are normal. Pupils are equal, round, and reactive to light. No scleral icterus.   Ears:  External ears unremarkable. Tympanic membranes clear and intact.  Nose: Nares patent. Septum midline. Turbinates without erythema nor edema. No discharge.   Mouth/Throat: Tongue normal. Oropharynx is clear and moist. Posterior pharynx without erythema or exudates.  Neck: Supple, trachea midline. Normal range of motion. No thyromegaly present. No lymphadenopathy--cervical or supraclavicular.  Cardiovascular: Regular rate and rhythm, S1 and S2 without murmur, rubs, or gallops.    Respiratory: Effort normal. Clear to auscultation throughout. No adventitious sounds.   Abdomen: Soft, non tender, and without distention. Active bowel sounds in all four quadrants. No rebound, guarding, masses or HSM.  Extremities: No cyanosis, clubbing, erythema, nor edema. Distal pulses intact and symmetric.   Musculoskeletal: All major joints AROM full in all directions without pain.  Neurological: Alert and oriented x 3. Grossly non-focal. Strength and sensation grossly intact. DTRs 2+/3 and symmetric.   Psychiatric:  Behavior, mood, and affect are appropriate.    Assessment and Plan:     1. Well adult exam  Continue with regular physical activity and good control diet.  Will check below labs.  Further treatment if needed pending results  - TSH WITH REFLEX TO FT4; Future  - Lipid Profile; Future  - Comp Metabolic Panel; Future  - CBC WITH DIFFERENTIAL; Future    2. " Immunity status testing  -New concern.  Requesting immunity status testing for school.  Labs ordered.  Further treatment if needed pending results  - MEASLES/MUMPS/RUBELLA IMMUNITY; Future  - VARICELLA ZOSTER IGG AB; Future  - HEP B SURFACE AB; Future  - Quantiferon Gold TB (PPD); Future    3. Screening-pulmonary TB  -New concern.  Needs to be screened for TB.  Lab ordered  - Quantiferon Gold TB (PPD); Future    4. Drug screening, pre-employment  -New concern.  Needs urine drug screen for school.  Completed in clinic today  - PAIN MANAGEMENT SCRN, UR; Future      Health maintenance:     Labs per orders  Immunizations per orders  Patient counseled about skin care, diet, supplements, and exercise.  Discussed  breast self exam, diet and exercise     Follow-up: Return in about 1 year (around 2/23/2025) for Annual PX.

## 2024-02-28 LAB
1OH-MIDAZOLAM UR QL SCN: NOT DETECTED
6MAM UR QL: NOT DETECTED
7AMINOCLONAZEPAM UR QL: NOT DETECTED
A-OH ALPRAZ UR QL: NOT DETECTED
ALPRAZ UR QL: NOT DETECTED
AMPHET UR QL SCN: NOT DETECTED
ANNOTATION COMMENT IMP: NORMAL
BARBITURATES UR QL: NEGATIVE
BUPRENORPHINE UR QL: NOT DETECTED
BZE UR QL: NEGATIVE
CARBOXYTHC UR QL: NEGATIVE
CARISOPRODOL UR QL: NEGATIVE
CLONAZEPAM UR QL: NOT DETECTED
CODEINE UR QL: NOT DETECTED
CREAT UR-MCNC: 125.1 MG/DL (ref 20–400)
DIAZEPAM UR QL: NOT DETECTED
ETHYL GLUCURONIDE UR QL: NORMAL
FENTANYL UR QL: NOT DETECTED
GABAPENTIN UR QL CFM: NOT DETECTED
HYDROCODONE UR QL: NOT DETECTED
HYDROMORPHONE UR QL: NOT DETECTED
LORAZEPAM UR QL: NOT DETECTED
MDA UR QL: NOT DETECTED
MDEA UR QL: NOT DETECTED
MDMA UR QL: NOT DETECTED
ME-PHENIDATE UR QL: NOT DETECTED
METHADONE UR QL: NEGATIVE
METHAMPHET UR QL: NOT DETECTED
MIDAZOLAM UR QL SCN: NOT DETECTED
MORPHINE UR QL: NOT DETECTED
NALOXONE UR QL CFM: NOT DETECTED
NORBUPRENORPHINE UR QL CFM: NOT DETECTED
NORDIAZEPAM UR QL: NOT DETECTED
NORFENTANYL UR QL: NOT DETECTED
NORHYDROCODONE UR QL CFM: NOT DETECTED
NORMEPERIDINE UR QL CFM: NOT DETECTED
NOROXYCODONE UR QL CFM: NOT DETECTED
NOROXYMORPHONE UR QL SCN: NOT DETECTED
OXAZEPAM UR QL: NOT DETECTED
OXYCODONE UR QL: NOT DETECTED
OXYMORPHONE UR QL: NOT DETECTED
PATHOLOGY STUDY: NORMAL
PCP UR QL: NEGATIVE
PHENTERMINE UR QL: NOT DETECTED
PREGABALIN UR QL CFM: NOT DETECTED
SERVICE CMNT-IMP: NORMAL
TAPENTADOL UR QL SCN: NOT DETECTED
TAPENTADOL UR QL SCN: NOT DETECTED
TEMAZEPAM UR QL: NOT DETECTED
TRAMADOL UR QL: NEGATIVE
ZOLPIDEM PHENYL-4-CARB UR QL SCN: NOT DETECTED
ZOLPIDEM UR QL: NOT DETECTED

## 2024-03-14 ENCOUNTER — HOSPITAL ENCOUNTER (OUTPATIENT)
Dept: LAB | Facility: MEDICAL CENTER | Age: 29
End: 2024-03-14
Attending: PHYSICIAN ASSISTANT
Payer: COMMERCIAL

## 2024-03-14 DIAGNOSIS — Z01.84 IMMUNITY STATUS TESTING: ICD-10-CM

## 2024-03-14 DIAGNOSIS — Z11.1 SCREENING-PULMONARY TB: ICD-10-CM

## 2024-03-14 DIAGNOSIS — Z00.00 WELL ADULT EXAM: ICD-10-CM

## 2024-03-14 LAB
ALBUMIN SERPL BCP-MCNC: 4.4 G/DL (ref 3.2–4.9)
ALBUMIN/GLOB SERPL: 1.4 G/DL
ALP SERPL-CCNC: 73 U/L (ref 30–99)
ALT SERPL-CCNC: 13 U/L (ref 2–50)
ANION GAP SERPL CALC-SCNC: 10 MMOL/L (ref 7–16)
AST SERPL-CCNC: 18 U/L (ref 12–45)
BASOPHILS # BLD AUTO: 1.1 % (ref 0–1.8)
BASOPHILS # BLD: 0.06 K/UL (ref 0–0.12)
BILIRUB SERPL-MCNC: 0.4 MG/DL (ref 0.1–1.5)
BUN SERPL-MCNC: 11 MG/DL (ref 8–22)
CALCIUM ALBUM COR SERPL-MCNC: 9.1 MG/DL (ref 8.5–10.5)
CALCIUM SERPL-MCNC: 9.4 MG/DL (ref 8.5–10.5)
CHLORIDE SERPL-SCNC: 106 MMOL/L (ref 96–112)
CHOLEST SERPL-MCNC: 133 MG/DL (ref 100–199)
CO2 SERPL-SCNC: 25 MMOL/L (ref 20–33)
CREAT SERPL-MCNC: 0.61 MG/DL (ref 0.5–1.4)
EOSINOPHIL # BLD AUTO: 0.24 K/UL (ref 0–0.51)
EOSINOPHIL NFR BLD: 4.5 % (ref 0–6.9)
ERYTHROCYTE [DISTWIDTH] IN BLOOD BY AUTOMATED COUNT: 41.5 FL (ref 35.9–50)
GFR SERPLBLD CREATININE-BSD FMLA CKD-EPI: 123 ML/MIN/1.73 M 2
GLOBULIN SER CALC-MCNC: 3.1 G/DL (ref 1.9–3.5)
GLUCOSE SERPL-MCNC: 94 MG/DL (ref 65–99)
HBV SURFACE AB SERPL IA-ACNC: 54.4 MIU/ML (ref 0–10)
HCT VFR BLD AUTO: 41.6 % (ref 37–47)
HDLC SERPL-MCNC: 56 MG/DL
HGB BLD-MCNC: 14.2 G/DL (ref 12–16)
IMM GRANULOCYTES # BLD AUTO: 0.01 K/UL (ref 0–0.11)
IMM GRANULOCYTES NFR BLD AUTO: 0.2 % (ref 0–0.9)
LDLC SERPL CALC-MCNC: 70 MG/DL
LYMPHOCYTES # BLD AUTO: 2.09 K/UL (ref 1–4.8)
LYMPHOCYTES NFR BLD: 39.2 % (ref 22–41)
MCH RBC QN AUTO: 29.8 PG (ref 27–33)
MCHC RBC AUTO-ENTMCNC: 34.1 G/DL (ref 32.2–35.5)
MCV RBC AUTO: 87.4 FL (ref 81.4–97.8)
MONOCYTES # BLD AUTO: 0.52 K/UL (ref 0–0.85)
MONOCYTES NFR BLD AUTO: 9.8 % (ref 0–13.4)
NEUTROPHILS # BLD AUTO: 2.41 K/UL (ref 1.82–7.42)
NEUTROPHILS NFR BLD: 45.2 % (ref 44–72)
NRBC # BLD AUTO: 0 K/UL
NRBC BLD-RTO: 0 /100 WBC (ref 0–0.2)
PLATELET # BLD AUTO: 320 K/UL (ref 164–446)
PMV BLD AUTO: 10.2 FL (ref 9–12.9)
POTASSIUM SERPL-SCNC: 4.4 MMOL/L (ref 3.6–5.5)
PROT SERPL-MCNC: 7.5 G/DL (ref 6–8.2)
RBC # BLD AUTO: 4.76 M/UL (ref 4.2–5.4)
SODIUM SERPL-SCNC: 141 MMOL/L (ref 135–145)
TRIGL SERPL-MCNC: 33 MG/DL (ref 0–149)
TSH SERPL DL<=0.005 MIU/L-ACNC: 1.82 UIU/ML (ref 0.38–5.33)
WBC # BLD AUTO: 5.3 K/UL (ref 4.8–10.8)

## 2024-03-14 PROCEDURE — 86762 RUBELLA ANTIBODY: CPT

## 2024-03-14 PROCEDURE — 86735 MUMPS ANTIBODY: CPT

## 2024-03-14 PROCEDURE — 86706 HEP B SURFACE ANTIBODY: CPT

## 2024-03-14 PROCEDURE — 84443 ASSAY THYROID STIM HORMONE: CPT

## 2024-03-14 PROCEDURE — 86765 RUBEOLA ANTIBODY: CPT

## 2024-03-14 PROCEDURE — 80053 COMPREHEN METABOLIC PANEL: CPT

## 2024-03-14 PROCEDURE — 80061 LIPID PANEL: CPT

## 2024-03-14 PROCEDURE — 85025 COMPLETE CBC W/AUTO DIFF WBC: CPT

## 2024-03-14 PROCEDURE — 86787 VARICELLA-ZOSTER ANTIBODY: CPT

## 2024-03-14 PROCEDURE — 36415 COLL VENOUS BLD VENIPUNCTURE: CPT

## 2024-03-14 PROCEDURE — 86480 TB TEST CELL IMMUN MEASURE: CPT

## 2024-03-15 LAB
GAMMA INTERFERON BACKGROUND BLD IA-ACNC: 0.05 IU/ML
M TB IFN-G BLD-IMP: NEGATIVE
M TB IFN-G CD4+ BCKGRND COR BLD-ACNC: 0.04 IU/ML
MEV IGG SER-ACNC: >300 AU/ML
MITOGEN IGNF BCKGRD COR BLD-ACNC: >10 IU/ML
MUV IGG SER IA-ACNC: 152 AU/ML
QFT TB2 - NIL TBQ2: 0.13 IU/ML
RUBV AB SER QL: 372 IU/ML
VZV IGG SER IA-ACNC: 178.5 IV

## 2024-03-28 ENCOUNTER — OFFICE VISIT (OUTPATIENT)
Dept: MEDICAL GROUP | Age: 29
End: 2024-03-28
Payer: COMMERCIAL

## 2024-03-28 VITALS
TEMPERATURE: 97.9 F | HEART RATE: 94 BPM | WEIGHT: 116 LBS | RESPIRATION RATE: 15 BRPM | DIASTOLIC BLOOD PRESSURE: 70 MMHG | BODY MASS INDEX: 21.35 KG/M2 | OXYGEN SATURATION: 97 % | HEIGHT: 62 IN | SYSTOLIC BLOOD PRESSURE: 112 MMHG

## 2024-03-28 DIAGNOSIS — R41.840 INATTENTION: ICD-10-CM

## 2024-03-28 DIAGNOSIS — F41.9 ANXIETY: ICD-10-CM

## 2024-03-28 PROCEDURE — 3074F SYST BP LT 130 MM HG: CPT | Performed by: PHYSICIAN ASSISTANT

## 2024-03-28 PROCEDURE — 3078F DIAST BP <80 MM HG: CPT | Performed by: PHYSICIAN ASSISTANT

## 2024-03-28 PROCEDURE — 99214 OFFICE O/P EST MOD 30 MIN: CPT | Performed by: PHYSICIAN ASSISTANT

## 2024-03-28 ASSESSMENT — FIBROSIS 4 INDEX: FIB4 SCORE: 0.47

## 2024-03-28 NOTE — PROGRESS NOTES
cc: Referral to psychiatry, иван for ADD    Subjective:     KERLINE  Chelsey Spear is a 29 y.o. female presenting with concerns of ADD.  She believes she has had some form of this for years, but has never been thoroughly evaluated.  She has recently gone back to school and has been really finding it difficult to sit and study.  She can barely get through the page without getting distracted to go start another task, she will start another task in the kitchen, then start cleaning, she notes that its much worse when she does not have structure, which her kids are staying with her mom for this week during spring break, and it has been fairly prominent.  This in turn is also giving her moderate anxiety.  She would like to be thoroughly evaluated for this.    Review of systems:  See above.    Latest Reference Range & Units 03/14/24 08:36   WBC 4.8 - 10.8 K/uL 5.3   RBC 4.20 - 5.40 M/uL 4.76   Hemoglobin 12.0 - 16.0 g/dL 14.2   Hematocrit 37.0 - 47.0 % 41.6   MCV 81.4 - 97.8 fL 87.4   MCH 27.0 - 33.0 pg 29.8   MCHC 32.2 - 35.5 g/dL 34.1   RDW 35.9 - 50.0 fL 41.5   Platelet Count 164 - 446 K/uL 320   MPV 9.0 - 12.9 fL 10.2   Neutrophils-Polys 44.00 - 72.00 % 45.20   Neutrophils (Absolute) 1.82 - 7.42 K/uL 2.41   Lymphocytes 22.00 - 41.00 % 39.20   Lymphs (Absolute) 1.00 - 4.80 K/uL 2.09   Monocytes 0.00 - 13.40 % 9.80   Monos (Absolute) 0.00 - 0.85 K/uL 0.52   Eosinophils 0.00 - 6.90 % 4.50   Eos (Absolute) 0.00 - 0.51 K/uL 0.24   Basophils 0.00 - 1.80 % 1.10   Baso (Absolute) 0.00 - 0.12 K/uL 0.06   Immature Granulocytes 0.00 - 0.90 % 0.20   Immature Granulocytes (abs) 0.00 - 0.11 K/uL 0.01   Nucleated RBC 0.00 - 0.20 /100 WBC 0.00   NRBC (Absolute) K/uL 0.00   Sodium 135 - 145 mmol/L 141   Potassium 3.6 - 5.5 mmol/L 4.4   Chloride 96 - 112 mmol/L 106   Co2 20 - 33 mmol/L 25   Anion Gap 7.0 - 16.0  10.0   Glucose 65 - 99 mg/dL 94   Bun 8 - 22 mg/dL 11   Creatinine 0.50 - 1.40 mg/dL 0.61   GFR (CKD-EPI) >60 mL/min/1.73 m  "2 123   Calcium 8.5 - 10.5 mg/dL 9.4   Correct Calcium 8.5 - 10.5 mg/dL 9.1   AST(SGOT) 12 - 45 U/L 18   ALT(SGPT) 2 - 50 U/L 13   Alkaline Phosphatase 30 - 99 U/L 73   Total Bilirubin 0.1 - 1.5 mg/dL 0.4   Albumin 3.2 - 4.9 g/dL 4.4   Total Protein 6.0 - 8.2 g/dL 7.5   Globulin 1.9 - 3.5 g/dL 3.1   A-G Ratio g/dL 1.4   Cholesterol,Tot 100 - 199 mg/dL 133   Triglycerides 0 - 149 mg/dL 33   HDL >=40 mg/dL 56   LDL <100 mg/dL 70   TSH 0.380 - 5.330 uIU/mL 1.820   Hep B Surface Antibody Quant 0.00 - 10.00 mIU/mL 54.40 (H)   Mumps Ab Igg AU/mL 152.0   QFT Gold Plus Negative  Negative   QFT Mitogen - NIL IU/mL >10.00   QFT NIL IU/mL 0.05   QFT TB1 - NIL <=0.34 IU/mL 0.04   QFT TB2 - NIL <=0.34 IU/mL 0.13   Rubella IgG Antibody IU/mL 372.00   Rubeola Igg Antibody AU/mL >300.0   Varicella Zoster IgG Ab .5   (H): Data is abnormally high    Current Outpatient Medications:     multivitamin Tab, Take 1 Tablet by mouth every day., Disp: , Rfl:     Allergies, past medical history, past surgical history, family history, social history reviewed and updated    Objective:     Vitals: /70 (BP Location: Left arm, Patient Position: Sitting, BP Cuff Size: Adult)   Pulse 94   Temp 36.6 °C (97.9 °F) (Temporal)   Resp 15   Ht 1.575 m (5' 2\")   Wt 52.6 kg (116 lb)   SpO2 97%   BMI 21.22 kg/m²   General: Alert, pleasant, NAD  HEENT: Normocephalic. Neck supple.   Heart: Regular rate and rhythm.  S1 and S2 normal.  No murmurs appreciated.  Respiratory: Normal respiratory effort.  Clear to auscultation bilaterally.  Skin: Warm, dry, no rashes.  Psych:  Affect/mood is normal, judgement is good, memory is intact, grooming is appropriate.    Assessment/Plan:     Chelsey was seen today for follow-up.    Diagnoses and all orders for this visit:    Inattention  -Based off of symptoms do believe she definitely has some form of ADD, however we will have thoroughly evaluated by psychiatry and for continued medication management if " deemed appropriate.  Referral placed.  -     Referral to Psychiatry    Anxiety  -Could be side effect from untreated ADD, or anxiety in general.  Referral placed to psychiatry.  -     Referral to Psychiatry        Return in about 1 year (around 3/28/2025) for Annual PX.

## 2024-05-16 ENCOUNTER — TELEPHONE (OUTPATIENT)
Dept: HEALTH INFORMATION MANAGEMENT | Facility: OTHER | Age: 29
End: 2024-05-16
Payer: COMMERCIAL

## 2024-05-16 DIAGNOSIS — Z12.83 SKIN CANCER SCREENING: ICD-10-CM

## 2024-05-16 DIAGNOSIS — D22.9 ATYPICAL MOLE: ICD-10-CM

## 2024-05-16 NOTE — TELEPHONE ENCOUNTER
1. Caller Name: Chelsey Spear                          Call Back Number: [phone] (home)         How would the patient prefer to be contacted with a response: ZAPhart message    2. SPECIFIC Action To Be Taken: Referral pending, please sign.    3. Diagnosis/Clinical Reason for Request:  pt has a few moles she would like to have looked at as well as a full check for skin cancer. She has never been checked. Please let us know if she needs an appointment for this referral. Thank you.     4. Specialty & Provider Name/Lab/Imaging Location: n/a    5. Is appointment scheduled for requested order/referral: no    Patient was not informed they will receive a return phone call from the office ONLY if there are any questions before processing their request. Advised to call back if they haven't received a call from the referral department in 5 days.

## 2024-06-06 ENCOUNTER — OFFICE VISIT (OUTPATIENT)
Dept: DERMATOLOGY | Facility: IMAGING CENTER | Age: 29
End: 2024-06-06
Payer: COMMERCIAL

## 2024-06-06 DIAGNOSIS — L70.0 ACNE VULGARIS: ICD-10-CM

## 2024-06-06 DIAGNOSIS — D22.9 MULTIPLE BENIGN NEVI: ICD-10-CM

## 2024-06-06 DIAGNOSIS — L81.4 LENTIGO: ICD-10-CM

## 2024-06-06 DIAGNOSIS — Z12.83 SKIN CANCER SCREENING: ICD-10-CM

## 2024-06-06 PROCEDURE — 99204 OFFICE O/P NEW MOD 45 MIN: CPT | Performed by: STUDENT IN AN ORGANIZED HEALTH CARE EDUCATION/TRAINING PROGRAM

## 2024-06-06 RX ORDER — ADAPALENE GEL USP, 0.3% 3 MG/G
GEL TOPICAL
Qty: 45 G | Refills: 6 | Status: SHIPPED | OUTPATIENT
Start: 2024-06-06

## 2024-06-06 NOTE — PROGRESS NOTES
[facility] Note    CC:  brown spot on cheek     HPI:  Chelsey Spear is a 29 y.o. female who presents today as new patient for evaluation and management of      - friend recently diagnosed with skin cancer, wanted to be checked   - recently had her tubes tied, off birth control now. Has noticed increased painful acne bumps since then   - denies any new or changing lesions  - no lesions with itching/bleeding/pain     Derm Skin Cancer Risk History:   - history of severe/blistering sunburns: none, but at beach a lot   - history if indoor tanning bed use: none  - family history of skin cancer: not that she is aware of  - prior history of biopsies/pre-cancers: no   - prior history of skin cancer: no   - sunscreen use: using on face daily      ROS: no fevers/chills. Other pertinent positives and negatives as above.     Meds/PMH/PSH/FamHx/Allergies: I have reviewed past medical history, surgical history, medications family history, allergies relevant to my specialty in the chart.       PHYSICAL EXAM:   A full skin exam was completed of the scalp, hair, ears, face, eyelids, conjunctiva, lips, neck, chest, abdomen, back, left and right upper extremities (including hands/digits and fingernails), left and right lower extremities (including feet/toes, toenails), with the following pertinent findings listed below.   Remaining above-listed examined areas within normal limits / negative for rashes or lesions.    - on the bilateral cheeks, there are scattered few erythematous papules, 1-2 pustules, 1-2 open and closed comedones   - left medial cheek, vertex scalp with light brown macule with moth eaten borders, c/w lentigo  - scattered dark brown macules on back, arms, legs c/w acquired junctional nevi       IMPRESSION / PLAN:    Acne vulgaris, inflammatory , mild  - educated patient about diagnosis, management options, and expectations of treatment  - recommended over the counter gentle daily face wash (La  Roche Posse, Cetaphil, Cerave, etc)  - recommended over the counter benzoyl peroxide 4% wash for face in shower. S/e bleaching of clothes/towels discussed  - start retinoid adapalene 0.3% ointment at night. To use pea-sized amount on entire face; start 2-3 nights/week and titrate up as tolerated. S/e irritation discussed.   - if skin becomes dry, OTC moisturizers recommended  - hold spironolactone in future in reserve     Lentigos  - benign appearing today, reassurance   - discussed relation to sun exposure   - call if changes, including changing color/border/shape     Benign melanocytic nevi, multiple   - Lesion appears benign today. Symmetrical color, pigment network on dermoscopy   - ABCDEs of melanoma discussed     Sun protection/skin cancer counseling   - discussed importance of regular sun protection/sunscreen use, SPF 30 or greater with broad spectrum coverage need for reapplication every  minutes.   - Hand out provided with detailed instructions   - recommend broad brimmed hats, UPF sun protective clothing when outdoors for extended periods of time   - educated on self checks at home, handout provided on ABCDEs of melanoma         Follow up:  nael Olivia MD   [facility]

## 2024-06-26 ENCOUNTER — EH NON-PROVIDER (OUTPATIENT)
Dept: OCCUPATIONAL MEDICINE | Facility: CLINIC | Age: 29
End: 2024-06-26

## 2024-06-26 ENCOUNTER — EMPLOYEE HEALTH (OUTPATIENT)
Dept: OCCUPATIONAL MEDICINE | Facility: CLINIC | Age: 29
End: 2024-06-26

## 2024-06-26 DIAGNOSIS — Z02.89 VISIT FOR OCCUPATIONAL HEALTH EXAMINATION: ICD-10-CM

## 2024-06-26 DIAGNOSIS — Z02.1 PRE-EMPLOYMENT HEALTH SCREENING EXAMINATION: ICD-10-CM

## 2024-06-26 DIAGNOSIS — Z02.1 PRE-EMPLOYMENT DRUG SCREENING: ICD-10-CM

## 2024-06-26 LAB
AMP AMPHETAMINE: NORMAL
BAR BARBITURATES: NORMAL
BZO BENZODIAZEPINES: NORMAL
COC COCAINE: NORMAL
INT CON NEG: NORMAL
INT CON POS: NORMAL
MDMA ECSTASY: NORMAL
MET METHAMPHETAMINES: NORMAL
MTD METHADONE: NORMAL
OPI OPIATES: NORMAL
OXY OXYCODONE: NORMAL
PCP PHENCYCLIDINE: NORMAL
POC URINE DRUG SCREEN OCDRS: NORMAL
THC: NORMAL

## 2024-06-26 NOTE — PROGRESS NOTES
Pre Employment Drug Screen Completed.   No color Blindness required at this time.  TB drawn - tubes were verified by patient to make sure they were filled up to the black box on the tubes.  Physical Required   Mask Fit not required   HH form was given to the patient.   Docs: Tdap, COVID, MMR, VZV, Tb, and Hep B

## 2025-02-24 ENCOUNTER — APPOINTMENT (OUTPATIENT)
Dept: MEDICAL GROUP | Age: 30
End: 2025-02-24
Payer: COMMERCIAL

## 2025-03-31 ENCOUNTER — OFFICE VISIT (OUTPATIENT)
Dept: URGENT CARE | Facility: PHYSICIAN GROUP | Age: 30
End: 2025-03-31
Payer: COMMERCIAL

## 2025-03-31 VITALS
HEART RATE: 85 BPM | WEIGHT: 105.93 LBS | SYSTOLIC BLOOD PRESSURE: 108 MMHG | RESPIRATION RATE: 14 BRPM | OXYGEN SATURATION: 98 % | BODY MASS INDEX: 19.49 KG/M2 | TEMPERATURE: 98.2 F | DIASTOLIC BLOOD PRESSURE: 62 MMHG | HEIGHT: 62 IN

## 2025-03-31 DIAGNOSIS — K12.2 UVULITIS: ICD-10-CM

## 2025-03-31 DIAGNOSIS — J34.89 NASAL CONGESTION WITH RHINORRHEA: ICD-10-CM

## 2025-03-31 DIAGNOSIS — R09.81 NASAL CONGESTION WITH RHINORRHEA: ICD-10-CM

## 2025-03-31 DIAGNOSIS — R50.9 FEVER, UNSPECIFIED: ICD-10-CM

## 2025-03-31 DIAGNOSIS — J02.0 PHARYNGITIS DUE TO STREPTOCOCCUS SPECIES: ICD-10-CM

## 2025-03-31 LAB
FLUAV RNA SPEC QL NAA+PROBE: NEGATIVE
FLUBV RNA SPEC QL NAA+PROBE: NEGATIVE
RSV RNA SPEC QL NAA+PROBE: NEGATIVE
S PYO DNA SPEC NAA+PROBE: DETECTED
SARS-COV-2 RNA RESP QL NAA+PROBE: NEGATIVE

## 2025-03-31 RX ORDER — DEXTROAMPHETAMINE SACCHARATE, AMPHETAMINE ASPARTATE MONOHYDRATE, DEXTROAMPHETAMINE SULFATE AND AMPHETAMINE SULFATE 2.5; 2.5; 2.5; 2.5 MG/1; MG/1; MG/1; MG/1
5 CAPSULE, EXTENDED RELEASE ORAL EVERY MORNING
COMMUNITY

## 2025-03-31 RX ORDER — CEPHALEXIN 500 MG/1
500 CAPSULE ORAL 2 TIMES DAILY
Qty: 20 CAPSULE | Refills: 0 | Status: SHIPPED | OUTPATIENT
Start: 2025-03-31 | End: 2025-04-10

## 2025-03-31 RX ORDER — DEXAMETHASONE SODIUM PHOSPHATE 10 MG/ML
10 INJECTION, SOLUTION INTRA-ARTICULAR; INTRALESIONAL; INTRAMUSCULAR; INTRAVENOUS; SOFT TISSUE ONCE
Status: COMPLETED | OUTPATIENT
Start: 2025-03-31 | End: 2025-03-31

## 2025-03-31 RX ADMIN — DEXAMETHASONE SODIUM PHOSPHATE 10 MG: 10 INJECTION, SOLUTION INTRA-ARTICULAR; INTRALESIONAL; INTRAMUSCULAR; INTRAVENOUS; SOFT TISSUE at 10:19

## 2025-03-31 ASSESSMENT — ENCOUNTER SYMPTOMS
FEVER: 1
BACK PAIN: 0
EYE DISCHARGE: 0
WEIGHT LOSS: 0
MYALGIAS: 1
COUGH: 0
CHILLS: 0
SORE THROAT: 1
SINUS PAIN: 0
EYE PAIN: 0

## 2025-03-31 ASSESSMENT — FIBROSIS 4 INDEX: FIB4 SCORE: 0.48

## 2025-03-31 NOTE — LETTER
March 31, 2025    To Whom It May Concern:         This is confirmation that Chelsey Spear attended her scheduled appointment with FELA Smyth on 3/31/25. She may return on 4/2/25         If you have any questions please do not hesitate to call me at the phone number listed below.    Sincerely,          NORMAN Smyth.  [phone]

## 2025-03-31 NOTE — PROGRESS NOTES
"Subjective:   Chelsey Spear is a 30 y.o. female who presents for Pharyngitis (X2 days difficulty breathing when laying down, congestion, and fatigue.)      Patient presents with complaints of 2-day history of pharyngitis, nasal congestion, and fever.  States that symptoms started very suddenly and have been progressing since.  She denies any cough or any sick contacts that she is aware of, though she does have multiple coworkers who have been sick as well as her children who she thinks may have \"brought this home.\"  She denies any stridor or wheezing, though she does have some subjective short of breath when laying down.  She denies pain being significantly worse on 1 side or the other, no nausea or vomiting.    Pharyngitis   Associated symptoms include congestion. Pertinent negatives include no coughing.       Review of Systems   Constitutional:  Positive for fever. Negative for chills, malaise/fatigue and weight loss.   HENT:  Positive for congestion and sore throat. Negative for sinus pain.    Eyes:  Negative for pain and discharge.   Respiratory:  Negative for cough.    Cardiovascular:  Negative for chest pain.   Musculoskeletal:  Positive for myalgias. Negative for back pain and joint pain.   Skin:  Negative for rash.     Refer to HPI for additional details.    During this visit, appropriate PPE was worn, and hand hygiene was performed.    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    She has no past medical history of Addisons disease ([facility]), Adrenal disorder ([facility]), Allergy, Anemia, Blood transfusion without reported diagnosis, Clotting disorder ([facility]), Cushings syndrome ([facility]), Diabetic neuropathy ([facility]), GERD (gastroesophageal reflux disease), Hyperlipidemia, IBD (inflammatory bowel disease), Meningitis, Migraine, Muscle disorder, Osteoporosis, Parathyroid disorder ([facility]), Pituitary disease ([facility]), Sickle cell disease ([facility]), or Substance abuse " "([facility]).    MEDS:   Current Outpatient Medications:     amphetamine-dextroamphetamine XR (ADDERALL XR) 10 MG CAPSULE SR 24 HR, Take 5 mg by mouth every morning., Disp: , Rfl:     cephALEXin (KEFLEX) 500 MG Cap, Take 1 Capsule by mouth 2 times a day for 10 days., Disp: 20 Capsule, Rfl: 0    Adapalene 0.3 % Gel, Apply thin pea-sized amount to face at night; start 2-3 nights/week. Increase to daily application at night as tolerated. (Patient not taking: Reported on 3/31/2025), Disp: 45 g, Rfl: 6    multivitamin Tab, Take 1 Tablet by mouth every day. (Patient not taking: Reported on 3/31/2025), Disp: , Rfl:     ALLERGIES:   Allergies   Allergen Reactions    Amoxicillin Hives     SURGHX:   Past Surgical History:   Procedure Laterality Date    SC LAP,DIAGNOSTIC ABDOMEN N/A 12/15/2023    Procedure: LAPAROSCOPIC BILATERAL SALPINGECTOMY;  Surgeon: Lorelei Fernandez M.D.;  Location: SURGERY SAME DAY [facility];  Service: Gynecology    TUBAL COAGULATION LAPAROSCOPIC BILATERAL       SOCHX:  reports that she quit smoking about 5 years ago. Her smoking use included cigarettes. She started smoking about 9 years ago. She has never used smokeless tobacco. She reports current alcohol use of about 0.6 oz of alcohol per week. She reports that she does not currently use drugs after having used the following drugs: Marijuana.    FH: Per HPI as applicable/pertinent.    Medications, Allergies, and current problem list reviewed today in Epic.     Objective:     /62   Pulse 85   Temp 36.8 °C (98.2 °F) (Temporal)   Resp 14   Ht 1.575 m (5' 2\")   Wt 48.1 kg (105 lb 14.9 oz)   SpO2 98%     Physical Exam  Vitals reviewed.   Constitutional:       General: She is not in acute distress.     Appearance: She is normal weight. She is not ill-appearing.   HENT:      Head: Normocephalic and atraumatic.      Right Ear: Tympanic membrane, ear canal and external ear normal.      Left Ear: Tympanic membrane, ear canal and external ear normal.      " Nose: Congestion and rhinorrhea present.      Mouth/Throat:      Mouth: Mucous membranes are moist.      Pharynx: Oropharyngeal exudate, posterior oropharyngeal erythema, uvula swelling and postnasal drip present. No pharyngeal swelling.      Tonsils: Tonsillar exudate present. No tonsillar abscesses. 2+ on the right. 2+ on the left.        Comments: Marked tonsillar erythema and exudates with uvula swelling.  Uvula is midline, airway is patent no hot potato voice and no signs of PTA.  Eyes:      General:         Right eye: No discharge.         Left eye: No discharge.      Pupils: Pupils are equal, round, and reactive to light.   Cardiovascular:      Rate and Rhythm: Normal rate.   Pulmonary:      Effort: Pulmonary effort is normal. No respiratory distress.      Breath sounds: No stridor. No wheezing, rhonchi or rales.   Chest:      Chest wall: No tenderness.   Abdominal:      General: Abdomen is flat.      Tenderness: There is no abdominal tenderness. There is no guarding.   Musculoskeletal:      Cervical back: Normal range of motion. Tenderness present. No rigidity.   Lymphadenopathy:      Cervical: Cervical adenopathy present.   Neurological:      Mental Status: She is alert.         Assessment/Plan:     Diagnosis and associated orders:     1. Pharyngitis due to Streptococcus species  - POCT CEPHEID GROUP A STREP - PCR  - cephALEXin (KEFLEX) 500 MG Cap; Take 1 Capsule by mouth 2 times a day for 10 days.  Dispense: 20 Capsule; Refill: 0    2. Uvulitis  - dexamethasone (Decadron) injection (check route below) 10 mg    3. Fever, unspecified  - POCT CEPHEID GROUP A STREP - PCR  - POCT CoV-2, Flu A/B, RSV by PCR    4. Nasal congestion with rhinorrhea  - POCT CEPHEID GROUP A STREP - PCR  - POCT CoV-2, Flu A/B, RSV by PCR    Other orders  - amphetamine-dextroamphetamine XR (ADDERALL XR) 10 MG CAPSULE SR 24 HR; Take 5 mg by mouth every morning.     Comments/MDM:     Patient history and physical exam are consistent with  acute pharyngitis and uvulitis with concomitant nasal congestion and fever.  Patient presents ill though non-toxic appearing in clinic no red flag symptoms seen on physical exam or endorsed by patient.  I discussed HPI and physical exam with patient.  I did suggest doing in clinic viral and strep testing as symptom duration has been short.  Do have high suspicion for potential strep etiology given tonsillar exudates and swelling.  I did explain to patient that her uvula is quite swollen, and suggested doing in clinic Decadron to help reduce swelling and help with symptoms.  Patient on board  In clinic strep swab positive  In clinic viral swab negative  Outpatient management will consist of copious fluids and electrolytes, stacked Tylenol and ibuprofen, viscous lidocaine and salt water gargles as needed for throat pain, Flonase for nasal decongestion, monitor symptoms  Follow up in 3-5 days if no improvement in symptoms    ED precautions given.  Strict and close monitoring of throat and airway.  Patient understands ED precautions.  Discussion and collaborative decision making used with the patient myself to develop treatment plan.  Patient understands the treatment plan of care, and further follow-up if needed.  No further questions           Differential diagnosis, natural history, supportive care, and indications for immediate follow-up discussed.    Advised the patient to follow-up with the primary care physician for recheck, reevaluation, and consideration of further management.    Please note that this dictation was created using voice recognition software. I have made a reasonable attempt to correct obvious errors, but I expect that there are errors of grammar and possibly content that I did not discover before finalizing the note.    This note was electronically signed by FELA Smyth

## 2025-05-01 ENCOUNTER — APPOINTMENT (OUTPATIENT)
Dept: MEDICAL GROUP | Age: 30
End: 2025-05-01
Payer: COMMERCIAL

## 2025-05-01 VITALS
HEIGHT: 62 IN | OXYGEN SATURATION: 98 % | HEART RATE: 76 BPM | DIASTOLIC BLOOD PRESSURE: 76 MMHG | BODY MASS INDEX: 19.14 KG/M2 | WEIGHT: 104 LBS | TEMPERATURE: 97.8 F | SYSTOLIC BLOOD PRESSURE: 120 MMHG

## 2025-05-01 DIAGNOSIS — F90.1 ATTENTION DEFICIT HYPERACTIVITY DISORDER (ADHD), PREDOMINANTLY HYPERACTIVE TYPE: ICD-10-CM

## 2025-05-01 DIAGNOSIS — Z00.00 BLOOD TESTS FOR ROUTINE GENERAL PHYSICAL EXAMINATION: ICD-10-CM

## 2025-05-01 DIAGNOSIS — R19.8 IRREGULAR BOWEL HABITS: ICD-10-CM

## 2025-05-01 DIAGNOSIS — Z91.018 ALLERGY TO FOOD: ICD-10-CM

## 2025-05-01 PROCEDURE — 3074F SYST BP LT 130 MM HG: CPT | Performed by: PHYSICIAN ASSISTANT

## 2025-05-01 PROCEDURE — 3078F DIAST BP <80 MM HG: CPT | Performed by: PHYSICIAN ASSISTANT

## 2025-05-01 PROCEDURE — 99214 OFFICE O/P EST MOD 30 MIN: CPT | Performed by: PHYSICIAN ASSISTANT

## 2025-05-01 RX ORDER — FLUCONAZOLE 150 MG/1
TABLET ORAL
COMMUNITY
Start: 2025-02-12 | End: 2025-05-01

## 2025-05-01 RX ORDER — ONDANSETRON 4 MG/1
1 TABLET, ORALLY DISINTEGRATING ORAL EVERY 8 HOURS PRN
COMMUNITY
End: 2025-05-01

## 2025-05-01 ASSESSMENT — PATIENT HEALTH QUESTIONNAIRE - PHQ9: CLINICAL INTERPRETATION OF PHQ2 SCORE: 0

## 2025-05-01 ASSESSMENT — FIBROSIS 4 INDEX: FIB4 SCORE: 0.48

## 2025-05-01 NOTE — PROGRESS NOTES
cc: Allergy concern    Subjective:     HPI    History of Present Illness  The patient is a 30-year-old female presenting with concerns of allergies, irregular bowel movements, and ADD.    Over the past 4 to 6 weeks, she has experienced potential food allergies. The initial episode occurred after consuming a peanut butter sandwich at work, resulting in a mild reaction characterized by tingling lips, congestion, the need to clear her throat, and lightheadedness within 5 to 10 minutes of ingestion. Subsequent consumption of peanut butter did not elicit a similar response. Certain grain-based foods, including wheat bread, oat milk creamer, and a cookie form of goldfish, have induced similar reactions. However, donuts and certain pastries are tolerated without issue. Bloating is reported, but no diarrhea. She is seeking advice on specific tests that could help identify the allergen. She has never had food allergies before. Lactose intolerance is noted, but she can consume small amounts of sour cream with only mild bloating.    She has been diagnosed with ADD, predominantly hyperactive, by psychiatry and is currently on a 5 mg medication regimen, as a higher dose was found to be excessive.    Prior to the onset of her allergy symptoms, irregular bowel movements have been experienced for several years, initially attributed to dietary factors such as Chinese food or inadequate water intake. Despite recent efforts to improve her diet and increase physical activity, bowel movements remain inconsistent, ranging from regular to soft stools, and occasionally diarrhea. Frequent hemorrhoids are reported, particularly after childbirth, with recent episodes of blood on wiping. Abdominal pain is experienced, sometimes improving after a bowel movement but can also persist. There is an occasional feeling of the need to defecate without being able to do so, with up to 2 days without a bowel movement. No over-the-counter fiber  "supplements are taken, but a multivitamin and probiotic are used. She attempts to incorporate more vegetables into her diet and aims to consume at least 40 ounces of water daily.  Denies melena, nausea, vomiting, hematochezia          Review of systems:  See above.       Current Outpatient Medications:     amphetamine-dextroamphetamine XR (ADDERALL XR) 10 MG CAPSULE SR 24 HR, Take 5 mg by mouth every morning., Disp: , Rfl:     Allergies, past medical history, past surgical history, family history, social history reviewed and updated    Objective:     Vitals: /76 (BP Location: Right arm, Patient Position: Sitting, BP Cuff Size: Adult)   Pulse 76   Temp 36.6 °C (97.8 °F) (Temporal)   Ht 1.575 m (5' 2\")   Wt 47.2 kg (104 lb)   SpO2 98%   BMI 19.02 kg/m²   General: Alert, pleasant, NAD  HEENT: Normocephalic. Neck supple.  No thyromegaly or masses palpated. No cervical or supraclavicular lymphadenopathy. No carotid bruits   Heart: Regular rate and rhythm.  S1 and S2 normal.  No murmurs appreciated.  Respiratory: Normal respiratory effort.  Clear to auscultation bilaterally.  Abdomen:  Normoactive bowel sounds.  Non-distended, soft, non-tender, no guarding/rebound. No hepatosplenomegaly.  No hernias.   Skin: Warm, dry, no rashes.  Psych:  Affect/mood is normal, judgement is good, memory is intact, grooming is appropriate.    Assessment/Plan:     Chelsey was seen today for allergic reaction and bowel problem.    Diagnoses and all orders for this visit:    Allergy to food  -Interestingly seems to be more related to consuming potentially oats.  Do not believe this is a full gluten allergy/intolerance.  Did advise to avoid oat products.  Referral placed to allergist to discuss additional testing  -     Referral to Allergy    Irregular bowel habits  She does have irregular bowel habits, tends to be more constipation predominant.  For now advised increase hydration, fiber daily.  Will follow-up at annual if still " persistent may consider additional imaging referral to gastroenterology    Attention deficit hyperactivity disorder (ADHD), predominantly hyperactive type  Currently managed by psychiatry.  Stable.  Continue 5 mg XR Adderall    Blood tests for routine general physical examination  -     TSH WITH REFLEX TO FT4; Future  -     Lipid Profile; Future  -     Comp Metabolic Panel; Future  -     CBC WITH DIFFERENTIAL; Future        Return in about 3 months (around 8/1/2025) for Annual PX, Lab Review.

## 2025-05-22 ENCOUNTER — APPOINTMENT (OUTPATIENT)
Dept: MEDICAL GROUP | Age: 30
End: 2025-05-22
Payer: COMMERCIAL

## 2025-08-08 ENCOUNTER — HOSPITAL ENCOUNTER (OUTPATIENT)
Dept: LAB | Facility: MEDICAL CENTER | Age: 30
End: 2025-08-08
Attending: INTERNAL MEDICINE
Payer: COMMERCIAL

## 2025-08-08 LAB
BASOPHILS # BLD AUTO: 0.7 % (ref 0–1.8)
BASOPHILS # BLD: 0.04 K/UL (ref 0–0.12)
EOSINOPHIL # BLD AUTO: 0.28 K/UL (ref 0–0.51)
EOSINOPHIL NFR BLD: 4.7 % (ref 0–6.9)
ERYTHROCYTE [DISTWIDTH] IN BLOOD BY AUTOMATED COUNT: 41.8 FL (ref 35.9–50)
HCT VFR BLD AUTO: 42.2 % (ref 37–47)
HGB BLD-MCNC: 14.2 G/DL (ref 12–16)
IMM GRANULOCYTES # BLD AUTO: 0 K/UL (ref 0–0.11)
IMM GRANULOCYTES NFR BLD AUTO: 0 % (ref 0–0.9)
LYMPHOCYTES # BLD AUTO: 2.96 K/UL (ref 1–4.8)
LYMPHOCYTES NFR BLD: 49.8 % (ref 22–41)
MCH RBC QN AUTO: 29.8 PG (ref 27–33)
MCHC RBC AUTO-ENTMCNC: 33.6 G/DL (ref 32.2–35.5)
MCV RBC AUTO: 88.5 FL (ref 81.4–97.8)
MONOCYTES # BLD AUTO: 0.55 K/UL (ref 0–0.85)
MONOCYTES NFR BLD AUTO: 9.3 % (ref 0–13.4)
NEUTROPHILS # BLD AUTO: 2.11 K/UL (ref 1.82–7.42)
NEUTROPHILS NFR BLD: 35.5 % (ref 44–72)
NRBC # BLD AUTO: 0 K/UL
NRBC BLD-RTO: 0 /100 WBC (ref 0–0.2)
PLATELET # BLD AUTO: 310 K/UL (ref 164–446)
PMV BLD AUTO: 10.1 FL (ref 9–12.9)
RBC # BLD AUTO: 4.77 M/UL (ref 4.2–5.4)
T4 FREE SERPL-MCNC: 1.24 NG/DL (ref 0.93–1.7)
TSH SERPL-ACNC: 3.54 UIU/ML (ref 0.38–5.33)
WBC # BLD AUTO: 5.9 K/UL (ref 4.8–10.8)

## 2025-08-08 PROCEDURE — 82785 ASSAY OF IGE: CPT

## 2025-08-08 PROCEDURE — 36415 COLL VENOUS BLD VENIPUNCTURE: CPT

## 2025-08-08 PROCEDURE — 85025 COMPLETE CBC W/AUTO DIFF WBC: CPT

## 2025-08-08 PROCEDURE — 84443 ASSAY THYROID STIM HORMONE: CPT

## 2025-08-08 PROCEDURE — 84439 ASSAY OF FREE THYROXINE: CPT

## 2025-08-08 PROCEDURE — 83088 ASSAY OF HISTAMINE: CPT

## 2025-08-08 PROCEDURE — 83520 IMMUNOASSAY QUANT NOS NONAB: CPT

## 2025-08-08 PROCEDURE — 86800 THYROGLOBULIN ANTIBODY: CPT

## 2025-08-08 PROCEDURE — 86003 ALLG SPEC IGE CRUDE XTRC EA: CPT | Mod: 91

## 2025-08-08 PROCEDURE — 86376 MICROSOMAL ANTIBODY EACH: CPT

## 2025-08-10 LAB
BARLEY IGE QN: <0.1 KU/L
DEPRECATED MISC ALLERGEN IGE RAST QL: NORMAL
IGE SERPL-ACNC: 2 KU/L
OAT IGE QN: <0.1 KU/L
RYE IGE QN: <0.1 KU/L
WHEAT IGE QN: <0.1 KU/L

## 2025-08-11 LAB
THYROGLOB AB SERPL-ACNC: <1.5 IU/ML (ref 0–4)
THYROPEROXIDASE AB SERPL-ACNC: 0.3 IU/ML (ref 0–9)
TRYPTASE SERPL-MCNC: 4.3 UG/L

## 2025-08-14 LAB — HISTAMINE SERPL-SCNC: <8 NMOL/L (ref 0–8)

## (undated) DEVICE — DRESSING TRANSPARENT FILM TEGADERM 2.375 X 2.75"  (100EA/BX)"

## (undated) DEVICE — TUBE CONNECTING SUCTION - CLEAR PLASTIC STERILE 72 IN (50EA/CA)

## (undated) DEVICE — TRAY FOLEY CATHETER STATLOCK 16FR SURESTEP  (10EA/CA)

## (undated) DEVICE — TROCAR 5X100 BLADED Z-THREAD - KII (6/BX)

## (undated) DEVICE — TROCAR Z THREAD 11 X 100 - BLADED (6/BX)

## (undated) DEVICE — SENSOR OXIMETER ADULT SPO2 RD SET (20EA/BX)

## (undated) DEVICE — CANISTER SUCTION RIGID RED 1500CC (40EA/CA)

## (undated) DEVICE — SLEEVE VASO CALF MED - (10PR/CA)

## (undated) DEVICE — SUCTION INSTRUMENT YANKAUER BULBOUS TIP W/O VENT (50EA/CA)

## (undated) DEVICE — GOWN WARMING STANDARD FLEX - (30/CA)

## (undated) DEVICE — CANISTER SUCTION 3000ML MECHANICAL FILTER AUTO SHUTOFF MEDI-VAC NONSTERILE LF DISP  (40EA/CA)

## (undated) DEVICE — SUTURE 4-0 MONOCRYL PLUS PS-2 - 27 INCH (36/BX)

## (undated) DEVICE — GLOVE SZ 6 BIOGEL PI MICRO - PF LF (50PR/BX 4BX/CA)

## (undated) DEVICE — SUTURE 0 VICRYL PLUS UR-6 - 27 INCH (36/BX)

## (undated) DEVICE — GLOVE BIOGEL SZ 6.5 SURGICAL PF LTX (50PR/BX 4BX/CA)

## (undated) DEVICE — CANNULA O2 COMFORT SOFT EAR ADULT 7 FT TUBING (50/CA)

## (undated) DEVICE — KIT  I.V. START (100EA/CA)

## (undated) DEVICE — TUBE E-T HI-LO CUFF 6.5MM (10EA/BX)

## (undated) DEVICE — TUBING CLEARLINK DUO-VENT - C-FLO (48EA/CA)

## (undated) DEVICE — SET LEADWIRE 5 LEAD BEDSIDE DISPOSABLE ECG (1SET OF 5/EA)

## (undated) DEVICE — LACTATED RINGERS INJ 1000 ML - (14EA/CA 60CA/PF)

## (undated) DEVICE — MASK OXYGEN VNYL ADLT MED CONC WITH 7 FOOT TUBING  - (50EA/CA)

## (undated) DEVICE — GLOVE BIOGEL INDICATOR SZ 7SURGICAL PF LTX - (50/BX 4BX/CA)

## (undated) DEVICE — CANNULA W/SEAL11X100ZTHREAD - (12/BX)

## (undated) DEVICE — SUTURE GENERAL

## (undated) DEVICE — TOWEL STOP TIMEOUT SAFETY FLAG (40EA/CA)

## (undated) DEVICE — SPONGE GAUZESTER. 2X2 4-PL - (2/PK 50PK/BX 30BX/CS)

## (undated) DEVICE — NEEDLE INSFL 120MM 14GA VRRS - (20/BX)

## (undated) DEVICE — PAD SANITARY 11IN MAXI IND WRAPPED  (12EA/PK 24PK/CA)

## (undated) DEVICE — SODIUM CHL IRRIGATION 0.9% 1000ML (12EA/CA)

## (undated) DEVICE — WATER IRRIGATION STERILE 1000ML (12EA/CA)

## (undated) DEVICE — Device

## (undated) DEVICE — DRAPESURG STERI-DRAPE LONG - (10/BX 4BX/CA)